# Patient Record
Sex: FEMALE | Race: WHITE | NOT HISPANIC OR LATINO | Employment: FULL TIME | URBAN - METROPOLITAN AREA
[De-identification: names, ages, dates, MRNs, and addresses within clinical notes are randomized per-mention and may not be internally consistent; named-entity substitution may affect disease eponyms.]

---

## 2017-01-13 ENCOUNTER — DOCUMENTATION (OUTPATIENT)
Dept: SPORTS MEDICINE | Facility: CLINIC | Age: 42
End: 2017-01-13

## 2017-01-13 NOTE — PROGRESS NOTES
1/13/17 At  2:25pm I called  and he said that we are the one that referred her to him and faxed papers to him asking him to see her. He did his part by seeing her. She also filled out paper work at time of visit with him. It is her responsability to find out what her Ins covers and what it don't. He will no longer discuss this issues with a third party such as this office, This is between Mrs. Batista and him from this point on. He will be more then happy to discuss payment plan with patient. If Mrs. Batista does not want to call him personal then  he will just let it go to collections. Katy AGUILAR

## 2017-01-16 ENCOUNTER — OFFICE VISIT (OUTPATIENT)
Dept: PSYCHIATRY | Facility: HOSPITAL | Age: 42
End: 2017-01-16

## 2017-01-16 DIAGNOSIS — F41.1 GENERALIZED ANXIETY DISORDER: Primary | ICD-10-CM

## 2017-01-16 PROCEDURE — 90834 PSYTX W PT 45 MINUTES: CPT | Performed by: SOCIAL WORKER

## 2017-01-17 NOTE — PROGRESS NOTES
7:05pm-7:55pm  Client is working mandatory overtime, not getting enough rest, and not spending as much time with her kids as she would like.  There is little time for exercise, etc.  She and ex had a conflict regarding a pharmacy bill for their daughter.  She did not engage in the fight but found it very stressful.  Divorce is final but he seems to still be engaged.

## 2017-02-03 ENCOUNTER — APPOINTMENT (OUTPATIENT)
Dept: PSYCHIATRY | Facility: HOSPITAL | Age: 42
End: 2017-02-03

## 2017-02-14 ENCOUNTER — OFFICE VISIT (OUTPATIENT)
Dept: PSYCHIATRY | Facility: HOSPITAL | Age: 42
End: 2017-02-14

## 2017-02-14 DIAGNOSIS — F41.1 GENERALIZED ANXIETY DISORDER: Primary | ICD-10-CM

## 2017-02-14 PROCEDURE — 90834 PSYTX W PT 45 MINUTES: CPT | Performed by: SOCIAL WORKER

## 2017-02-15 NOTE — PROGRESS NOTES
7:00pm-7:50pm  Note  Client learned through her daughter that ex- is dating a mom from their kids school-the mother of her daughter's close friends.  Her daughter is furious with Dad and wrote a very eloquent letter telling him so.  Client hates seeing her kids go through this.  Explained that her daughter's way of dealing with it is very adaptive.  Ex is now denying that there is a relationship but he has lost his credibility.  Client is still trying to understand how all this happened.

## 2017-02-21 RX ORDER — ACETAMINOPHEN AND CODEINE PHOSPHATE 120; 12 MG/5ML; MG/5ML
SOLUTION ORAL
Qty: 84 TABLET | OUTPATIENT
Start: 2017-02-21

## 2017-02-28 ENCOUNTER — OFFICE VISIT (OUTPATIENT)
Dept: PSYCHIATRY | Facility: HOSPITAL | Age: 42
End: 2017-02-28

## 2017-02-28 DIAGNOSIS — F41.1 GENERALIZED ANXIETY DISORDER: Primary | ICD-10-CM

## 2017-02-28 PROCEDURE — 90834 PSYTX W PT 45 MINUTES: CPT | Performed by: SOCIAL WORKER

## 2017-03-13 ENCOUNTER — OFFICE VISIT (OUTPATIENT)
Dept: PSYCHIATRY | Facility: HOSPITAL | Age: 42
End: 2017-03-13

## 2017-03-13 DIAGNOSIS — F41.1 GENERALIZED ANXIETY DISORDER: Primary | ICD-10-CM

## 2017-03-13 PROCEDURE — 90834 PSYTX W PT 45 MINUTES: CPT | Performed by: SOCIAL WORKER

## 2017-03-13 NOTE — PLAN OF CARE
Problem: [facility] Patient Care Overview (Adult)  Goal: Plan of Care Review    03/13/17 8699   Coping/Psychosocial Response Interventions   Plan Of Care Reviewed With patient   Coping/Psychosocial   Patient Agreement with Plan of Care agrees   Patient Care Overview   Progress progress toward functional goals as expected   Outcome Evaluation   Outcome Summary/Follow up Plan Client scored self the same as last review-3 on adjustment and 7 on self esteem. Realizes she is doing well, but feels sad.

## 2017-03-13 NOTE — PROGRESS NOTES
"4:05pm-4:55pm  Client sad, tearful, tired of being angry and upset about her ex.  She thinks he should communicate more with her about the kids.  Talked about the adjustment to being  and the \"new normal\".  She acknowledges that she does well most of the time but has bad days.  She confides in her family and her best friend-has not talked to the friend she believes chose her ex over her.  Recommended exercise.  She often works through lunch or takes a quick break but could go for a walk on nice days.  Going on a girls trip to [facility] in April to visit a friend.  Wants to feel better.  "

## 2017-04-03 ENCOUNTER — OFFICE VISIT (OUTPATIENT)
Dept: PSYCHIATRY | Facility: HOSPITAL | Age: 42
End: 2017-04-03

## 2017-04-03 DIAGNOSIS — F41.1 GENERALIZED ANXIETY DISORDER: Primary | ICD-10-CM

## 2017-04-03 PROCEDURE — 90834 PSYTX W PT 45 MINUTES: CPT | Performed by: SOCIAL WORKER

## 2017-04-04 NOTE — PROGRESS NOTES
6:00pm-6:50pm  Client is tired of being sad.  Son had his birthday at Dad's house and both sides of the family were there.  Ex behaves as if everything is OK now and they are one big happy family.  Client went to a function at school and the new girlfriend came up and asked to sit with her.  Daughter is angry with Dad and that is difficult for client.  Encouraged her to be supportive of daughter's feelings without getting involved.

## 2017-04-10 RX ORDER — SPIRONOLACTONE 50 MG/1
TABLET, FILM COATED ORAL
Qty: 180 TABLET | Refills: 2 | Status: SHIPPED | OUTPATIENT
Start: 2017-04-10 | End: 2017-12-17 | Stop reason: SDUPTHER

## 2017-04-18 ENCOUNTER — OFFICE VISIT (OUTPATIENT)
Dept: PSYCHIATRY | Facility: HOSPITAL | Age: 42
End: 2017-04-18

## 2017-04-18 DIAGNOSIS — F41.1 GENERALIZED ANXIETY DISORDER: Primary | ICD-10-CM

## 2017-04-18 PROCEDURE — 90834 PSYTX W PT 45 MINUTES: CPT | Performed by: SOCIAL WORKER

## 2017-04-19 NOTE — PROGRESS NOTES
"6:00pm-6:50pm  Client back from a vacation with friends and feeling much better.  It helped to get away and \"regroup\" after the divorce proceedings.  Continues spending time with family and friends.  Worries about her kids because ex seems so detached and she says does not look good-weight loss, unkempt.  Reminded client that all this is outside her control.  "

## 2017-05-05 RX ORDER — METOPROLOL SUCCINATE 50 MG/1
TABLET, EXTENDED RELEASE ORAL
Qty: 90 TABLET | Refills: 3 | Status: SHIPPED | OUTPATIENT
Start: 2017-05-05 | End: 2018-04-07 | Stop reason: SDUPTHER

## 2017-05-05 RX ORDER — TOPIRAMATE 50 MG/1
TABLET, FILM COATED ORAL
Qty: 90 TABLET | Refills: 3 | Status: SHIPPED | OUTPATIENT
Start: 2017-05-05 | End: 2019-02-28

## 2017-05-05 RX ORDER — FLUOXETINE HYDROCHLORIDE 20 MG/1
CAPSULE ORAL
Qty: 180 CAPSULE | Refills: 3 | Status: SHIPPED | OUTPATIENT
Start: 2017-05-05 | End: 2018-05-17 | Stop reason: SDUPTHER

## 2017-05-08 ENCOUNTER — OFFICE VISIT (OUTPATIENT)
Dept: PSYCHIATRY | Facility: HOSPITAL | Age: 42
End: 2017-05-08

## 2017-05-08 DIAGNOSIS — F41.1 GENERALIZED ANXIETY DISORDER: Primary | ICD-10-CM

## 2017-05-08 PROCEDURE — 90834 PSYTX W PT 45 MINUTES: CPT | Performed by: SOCIAL WORKER

## 2017-05-08 RX ORDER — ACETAMINOPHEN AND CODEINE PHOSPHATE 120; 12 MG/5ML; MG/5ML
SOLUTION ORAL
Qty: 84 TABLET | OUTPATIENT
Start: 2017-05-08

## 2017-05-23 ENCOUNTER — OFFICE VISIT (OUTPATIENT)
Dept: PSYCHIATRY | Facility: HOSPITAL | Age: 42
End: 2017-05-23

## 2017-05-23 DIAGNOSIS — F41.1 GENERALIZED ANXIETY DISORDER: Primary | ICD-10-CM

## 2017-05-23 PROCEDURE — 90834 PSYTX W PT 45 MINUTES: CPT | Performed by: SOCIAL WORKER

## 2017-06-06 ENCOUNTER — OFFICE VISIT (OUTPATIENT)
Dept: PSYCHIATRY | Facility: HOSPITAL | Age: 42
End: 2017-06-06

## 2017-06-06 DIAGNOSIS — F41.1 GENERALIZED ANXIETY DISORDER: Primary | ICD-10-CM

## 2017-06-06 PROCEDURE — 90834 PSYTX W PT 45 MINUTES: CPT | Performed by: SOCIAL WORKER

## 2017-06-06 NOTE — PROGRESS NOTES
6:00pm-6:50pm  Other than being sick with a head cold, client is doing very well.  Summers are busy but plans to go back to the divorce support group in the Fall.  Kids are on vacation with her ex.  Work is more manageable.  Gave contact info, as needed.

## 2017-12-18 RX ORDER — SPIRONOLACTONE 50 MG/1
TABLET, FILM COATED ORAL
Qty: 180 TABLET | Refills: 0 | Status: SHIPPED | OUTPATIENT
Start: 2017-12-18 | End: 2018-03-08 | Stop reason: SDUPTHER

## 2018-03-09 RX ORDER — SPIRONOLACTONE 50 MG/1
TABLET, FILM COATED ORAL
Qty: 180 TABLET | Refills: 0 | Status: SHIPPED | OUTPATIENT
Start: 2018-03-09 | End: 2018-05-02 | Stop reason: SDUPTHER

## 2018-04-09 RX ORDER — METOPROLOL SUCCINATE 50 MG/1
TABLET, EXTENDED RELEASE ORAL
Qty: 90 TABLET | Refills: 0 | Status: SHIPPED | OUTPATIENT
Start: 2018-04-09 | End: 2018-05-26 | Stop reason: SDUPTHER

## 2018-05-02 RX ORDER — SPIRONOLACTONE 50 MG/1
TABLET, FILM COATED ORAL
Qty: 180 TABLET | Refills: 2 | Status: SHIPPED | OUTPATIENT
Start: 2018-05-02 | End: 2019-01-14 | Stop reason: SDUPTHER

## 2018-05-18 RX ORDER — FLUOXETINE HYDROCHLORIDE 20 MG/1
CAPSULE ORAL
Qty: 180 CAPSULE | Refills: 0 | Status: SHIPPED | OUTPATIENT
Start: 2018-05-18 | End: 2018-05-26 | Stop reason: SDUPTHER

## 2018-05-29 RX ORDER — METOPROLOL SUCCINATE 50 MG/1
TABLET, EXTENDED RELEASE ORAL
Qty: 90 TABLET | Refills: 0 | Status: SHIPPED | OUTPATIENT
Start: 2018-05-29 | End: 2018-08-09 | Stop reason: SDUPTHER

## 2018-05-29 RX ORDER — FLUOXETINE HYDROCHLORIDE 20 MG/1
CAPSULE ORAL
Qty: 180 CAPSULE | Refills: 0 | Status: SHIPPED | OUTPATIENT
Start: 2018-05-29 | End: 2018-08-06 | Stop reason: SDUPTHER

## 2018-08-07 RX ORDER — FLUOXETINE HYDROCHLORIDE 20 MG/1
CAPSULE ORAL
Qty: 180 CAPSULE | Refills: 0 | Status: SHIPPED | OUTPATIENT
Start: 2018-08-07 | End: 2018-10-26 | Stop reason: SDUPTHER

## 2018-08-09 RX ORDER — METOPROLOL SUCCINATE 50 MG/1
TABLET, EXTENDED RELEASE ORAL
Qty: 90 TABLET | Refills: 2 | Status: SHIPPED | OUTPATIENT
Start: 2018-08-09 | End: 2021-03-29

## 2018-10-29 RX ORDER — FLUOXETINE HYDROCHLORIDE 20 MG/1
CAPSULE ORAL
Qty: 180 CAPSULE | Refills: 0 | Status: SHIPPED | OUTPATIENT
Start: 2018-10-29 | End: 2019-07-05

## 2019-01-14 ENCOUNTER — OFFICE VISIT (OUTPATIENT)
Dept: SPORTS MEDICINE | Facility: CLINIC | Age: 44
End: 2019-01-14

## 2019-01-14 VITALS
SYSTOLIC BLOOD PRESSURE: 104 MMHG | BODY MASS INDEX: 29.64 KG/M2 | TEMPERATURE: 98.1 F | HEIGHT: 60 IN | WEIGHT: 151 LBS | OXYGEN SATURATION: 98 % | HEART RATE: 112 BPM | DIASTOLIC BLOOD PRESSURE: 60 MMHG

## 2019-01-14 DIAGNOSIS — R41.3 MEMORY DISTURBANCE: ICD-10-CM

## 2019-01-14 DIAGNOSIS — R39.198 DIFFICULTY URINATING: ICD-10-CM

## 2019-01-14 DIAGNOSIS — R20.2 PARESTHESIAS: ICD-10-CM

## 2019-01-14 DIAGNOSIS — R61 NIGHT SWEATS: Primary | ICD-10-CM

## 2019-01-14 PROCEDURE — 99215 OFFICE O/P EST HI 40 MIN: CPT | Performed by: FAMILY MEDICINE

## 2019-01-14 RX ORDER — FERROUS SULFATE 325(65) MG
1 TABLET ORAL DAILY
COMMUNITY
End: 2019-05-10 | Stop reason: SDUPTHER

## 2019-01-14 RX ORDER — ACETAMINOPHEN AND CODEINE PHOSPHATE 120; 12 MG/5ML; MG/5ML
0.35 SOLUTION ORAL DAILY
COMMUNITY
Start: 2018-07-24 | End: 2021-03-29

## 2019-01-14 RX ORDER — LOSARTAN POTASSIUM 25 MG/1
TABLET ORAL
Refills: 1 | COMMUNITY
Start: 2018-12-21 | End: 2019-01-22 | Stop reason: SDUPTHER

## 2019-01-14 RX ORDER — FLUOXETINE HYDROCHLORIDE 20 MG/1
40 CAPSULE ORAL NIGHTLY
COMMUNITY
Start: 2016-09-19 | End: 2019-07-05

## 2019-01-14 RX ORDER — DOCUSATE SODIUM 100 MG/1
2 CAPSULE, LIQUID FILLED ORAL DAILY
COMMUNITY
Start: 2014-05-22 | End: 2019-05-10 | Stop reason: SDUPTHER

## 2019-01-14 RX ORDER — SPIRONOLACTONE 50 MG/1
50 TABLET, FILM COATED ORAL
COMMUNITY
End: 2020-05-12

## 2019-01-14 NOTE — PROGRESS NOTES
"Alyce is a 43 y.o. year old female    Chief Complaint   Patient presents with   • Numbness     extremities   • Difficulty Urinating   • Fatigue     with joint pain       History of Present Illness   HPI   Night sweats for the past several years  Hand tingling \"all the time\" in a nondermatomal distribution, seems to come and go, also has it in the feet.  Fatigue for the past couple years.  Trouble completely emptying baldder  Several days between BM but not consitpation, no sense of \"needing BM\"   Memory issues    Patient's main concern is possible multiple sclerosis.  I have reviewed the patient's medical, family, and social history in detail and updated the computerized patient record.    Review of Systems   Constitutional: Positive for fatigue.   HENT: Negative.    Eyes: Negative.    Respiratory: Negative.    Cardiovascular: Negative.    Gastrointestinal: Negative.    Endocrine: Negative.    Genitourinary: Negative.    Musculoskeletal: Negative.    Skin: Negative.    Allergic/Immunologic: Negative.    Neurological:        Per history of present illness   Hematological: Negative.    Psychiatric/Behavioral: Negative.        /60   Pulse 112   Temp 98.1 °F (36.7 °C)   Ht 152.4 cm (60\")   Wt 68.5 kg (151 lb)   SpO2 98%   BMI 29.49 kg/m²      Physical Exam   Constitutional: She is oriented to person, place, and time. She appears well-developed and well-nourished.   HENT:   Head: Normocephalic and atraumatic.   Eyes: Conjunctivae and EOM are normal. Pupils are equal, round, and reactive to light.   Cardiovascular: Normal rate, regular rhythm and normal heart sounds.   No peripheral edema   Pulmonary/Chest: Effort normal and breath sounds normal.   Musculoskeletal:   Negative Adson's and Yolis testing of upper extremities.  DTRs 1+ symmetric.  Negative Tinel over carpal tunnel.   Neurological: She is alert and oriented to person, place, and time. She exhibits normal muscle tone. Coordination normal.   Skin: " Skin is warm and dry.   Psychiatric: She has a normal mood and affect. Her behavior is normal. Thought content normal.   Vitals reviewed.        Current Outpatient Medications:   •  Calcium Carb-Cholecalciferol (CALCIUM 600 + D) 600-200 MG-UNIT tablet, Take 1 tablet by mouth Daily., Disp: , Rfl:   •  docusate sodium (COLACE) 100 MG capsule, Take 2 capsules by mouth daily., Disp: , Rfl:   •  docusate sodium (COLACE) 100 MG capsule, Take 2 capsules by mouth Daily., Disp: , Rfl:   •  FLUoxetine (PROzac) 20 MG capsule, TAKE 1 CAPSULE BY MOUTH TWO TIMES DAILY, Disp: 180 capsule, Rfl: 0  •  FLUoxetine (PROzac) 20 MG capsule, Take 40 mg by mouth Every Night., Disp: , Rfl:   •  losartan (COZAAR) 25 MG tablet, TK 1 T PO QAM, Disp: , Rfl: 1  •  metoprolol succinate XL (TOPROL-XL) 50 MG 24 hr tablet, TAKE 1 TABLET BY MOUTH  DAILY, Disp: 90 tablet, Rfl: 2  •  norethindrone (MICRONOR) 0.35 MG tablet, Take 0.35 mg by mouth Daily., Disp: , Rfl:   •  ferrous sulfate 325 (65 FE) MG tablet, Take 1 tablet by mouth Daily., Disp: , Rfl:   •  fexofenadine (WAL-FEX ALLERGY) 180 MG tablet, Take  by mouth., Disp: , Rfl:   •  spironolactone (ALDACTONE) 25 MG tablet, Take 25 mg by mouth 2 (Two) Times a Day., Disp: , Rfl:   •  topiramate (TOPAMAX) 50 MG tablet, Take 1 tablet by mouth at  bedtime, Disp: 90 tablet, Rfl: 3  •  VITAMIN D, CHOLECALCIFEROL, PO, Take 400 Units by mouth., Disp: , Rfl:      Diagnoses and all orders for this visit:    Night sweats  -     CBC & Differential  -     Comprehensive Metabolic Panel  -     TSH  -     T4, Free  -     Vitamin B12  -     Folate  -     Iron Profile  -     Vitamin D 25 Hydroxy  -     MRI brain wo contrast; Future  -     FSH & LH    Paresthesias  -     CBC & Differential  -     Comprehensive Metabolic Panel  -     TSH  -     T4, Free  -     Vitamin B12  -     Folate  -     Iron Profile  -     Vitamin D 25 Hydroxy  -     MRI brain wo contrast; Future  -     FSH & LH    Difficulty urinating  -     CBC  & Differential  -     Comprehensive Metabolic Panel  -     TSH  -     T4, Free  -     Vitamin B12  -     Folate  -     Iron Profile  -     Vitamin D 25 Hydroxy  -     MRI brain wo contrast; Future  -     FSH & LH    Memory disturbance  -     CBC & Differential  -     Comprehensive Metabolic Panel  -     TSH  -     T4, Free  -     Vitamin B12  -     Folate  -     Iron Profile  -     Vitamin D 25 Hydroxy  -     MRI brain wo contrast; Future  -     FSH & LH    Other orders  -     losartan (COZAAR) 25 MG tablet; TK 1 T PO QAM  -     VITAMIN D, CHOLECALCIFEROL, PO; Take 400 Units by mouth.  -     Calcium Carb-Cholecalciferol (CALCIUM 600 + D) 600-200 MG-UNIT tablet; Take 1 tablet by mouth Daily.  -     docusate sodium (COLACE) 100 MG capsule; Take 2 capsules by mouth Daily.  -     ferrous sulfate 325 (65 FE) MG tablet; Take 1 tablet by mouth Daily.  -     FLUoxetine (PROzac) 20 MG capsule; Take 40 mg by mouth Every Night.  -     norethindrone (MICRONOR) 0.35 MG tablet; Take 0.35 mg by mouth Daily.  -     spironolactone (ALDACTONE) 25 MG tablet; Take 25 mg by mouth 2 (Two) Times a Day.             EMR Dragon/Transcription disclaimer:    Much of this encounter note is an electronic transcription/translation of spoken language to printed text.  The electronic translation of spoken language may permit erroneous, or at times, nonsensical words or phrases to be inadvertently transcribed.  Although I have reviewed the note for such errors some may still exist.

## 2019-01-15 ENCOUNTER — TELEPHONE (OUTPATIENT)
Dept: SPORTS MEDICINE | Facility: CLINIC | Age: 44
End: 2019-01-15

## 2019-01-15 LAB
25(OH)D3+25(OH)D2 SERPL-MCNC: 117.9 NG/ML (ref 30–100)
ALBUMIN SERPL-MCNC: 4.6 G/DL (ref 3.5–5.2)
ALBUMIN/GLOB SERPL: 1.2 G/DL
ALP SERPL-CCNC: 93 U/L (ref 39–117)
ALT SERPL-CCNC: 26 U/L (ref 1–33)
AST SERPL-CCNC: 25 U/L (ref 1–32)
BASOPHILS # BLD AUTO: 0.04 10*3/MM3 (ref 0–0.2)
BASOPHILS NFR BLD AUTO: 0.4 % (ref 0–1.5)
BILIRUB SERPL-MCNC: <0.2 MG/DL (ref 0.1–1.2)
BUN SERPL-MCNC: 15 MG/DL (ref 6–20)
BUN/CREAT SERPL: 21.4 (ref 7–25)
CALCIUM SERPL-MCNC: 10 MG/DL (ref 8.6–10.5)
CHLORIDE SERPL-SCNC: 98 MMOL/L (ref 98–107)
CO2 SERPL-SCNC: 24.6 MMOL/L (ref 22–29)
CREAT SERPL-MCNC: 0.7 MG/DL (ref 0.57–1)
EOSINOPHIL # BLD AUTO: 0.36 10*3/MM3 (ref 0–0.7)
EOSINOPHIL NFR BLD AUTO: 3.6 % (ref 0.3–6.2)
ERYTHROCYTE [DISTWIDTH] IN BLOOD BY AUTOMATED COUNT: 12.3 % (ref 11.7–13)
FOLATE SERPL-MCNC: >20 NG/ML (ref 4.78–24.2)
FSH SERPL-ACNC: 3.5 MIU/ML
GLOBULIN SER CALC-MCNC: 3.7 GM/DL
GLUCOSE SERPL-MCNC: 103 MG/DL (ref 65–99)
HCT VFR BLD AUTO: 42.4 % (ref 35.6–45.5)
HGB BLD-MCNC: 14.2 G/DL (ref 11.9–15.5)
IMM GRANULOCYTES # BLD AUTO: 0.04 10*3/MM3 (ref 0–0.03)
IMM GRANULOCYTES NFR BLD AUTO: 0.4 % (ref 0–0.5)
IRON SATN MFR SERPL: 26 % (ref 20–50)
IRON SERPL-MCNC: 90 MCG/DL (ref 37–145)
LH SERPL-ACNC: 2.6 MIU/ML
LYMPHOCYTES # BLD AUTO: 2.9 10*3/MM3 (ref 0.9–4.8)
LYMPHOCYTES NFR BLD AUTO: 29.4 % (ref 19.6–45.3)
MCH RBC QN AUTO: 32 PG (ref 26.9–32)
MCHC RBC AUTO-ENTMCNC: 33.5 G/DL (ref 32.4–36.3)
MCV RBC AUTO: 95.5 FL (ref 80.5–98.2)
MONOCYTES # BLD AUTO: 1 10*3/MM3 (ref 0.2–1.2)
MONOCYTES NFR BLD AUTO: 10.1 % (ref 5–12)
NEUTROPHILS # BLD AUTO: 5.57 10*3/MM3 (ref 1.9–8.1)
NEUTROPHILS NFR BLD AUTO: 56.5 % (ref 42.7–76)
PLATELET # BLD AUTO: 348 10*3/MM3 (ref 140–500)
POTASSIUM SERPL-SCNC: 4.6 MMOL/L (ref 3.5–5.2)
PROT SERPL-MCNC: 8.3 G/DL (ref 6–8.5)
RBC # BLD AUTO: 4.44 10*6/MM3 (ref 3.9–5.2)
SODIUM SERPL-SCNC: 137 MMOL/L (ref 136–145)
T4 FREE SERPL-MCNC: 0.94 NG/DL (ref 0.93–1.7)
TIBC SERPL-MCNC: 340 MCG/DL
TSH SERPL DL<=0.005 MIU/L-ACNC: 2.22 MIU/ML (ref 0.27–4.2)
UIBC SERPL-MCNC: 250 MCG/DL
VIT B12 SERPL-MCNC: 470 PG/ML (ref 211–946)
WBC # BLD AUTO: 9.87 10*3/MM3 (ref 4.5–10.7)

## 2019-01-15 NOTE — TELEPHONE ENCOUNTER
Her Vit D is way too high, STOP vit D supplements now and recheck level in 6-8 weeks. I do not have the rest of her labs back yet

## 2019-01-15 NOTE — TELEPHONE ENCOUNTER
Frank (out [facility]) [phone],   Vitamin D level critical  Vit D 117.9 considered toxic anything over 100.

## 2019-01-17 ENCOUNTER — TELEPHONE (OUTPATIENT)
Dept: SPORTS MEDICINE | Facility: CLINIC | Age: 44
End: 2019-01-17

## 2019-01-18 RX ORDER — FLUOXETINE HYDROCHLORIDE 20 MG/1
CAPSULE ORAL
Qty: 180 CAPSULE | Refills: 3 | Status: SHIPPED | OUTPATIENT
Start: 2019-01-18 | End: 2019-07-05

## 2019-01-22 ENCOUNTER — TELEPHONE (OUTPATIENT)
Dept: SPORTS MEDICINE | Facility: CLINIC | Age: 44
End: 2019-01-22

## 2019-01-22 RX ORDER — LOSARTAN POTASSIUM 25 MG/1
25 TABLET ORAL DAILY
Qty: 90 TABLET | Refills: 3 | Status: SHIPPED | OUTPATIENT
Start: 2019-01-22 | End: 2019-05-10 | Stop reason: SDUPTHER

## 2019-01-22 NOTE — TELEPHONE ENCOUNTER
Patient LVM stating she has been going to [facility] and Dr. Magan Van has been weaning patient off of the metoprolol and placed patient on Losartan 25 mg. Patient requesting if you could continue prescribing the losartan?    Patient also requested copy of labs which have been mailed to address listed.

## 2019-01-24 ENCOUNTER — HOSPITAL ENCOUNTER (OUTPATIENT)
Dept: MRI IMAGING | Facility: HOSPITAL | Age: 44
Discharge: HOME | End: 2019-01-24
Admitting: FAMILY MEDICINE

## 2019-01-24 DIAGNOSIS — R41.3 MEMORY DISTURBANCE: ICD-10-CM

## 2019-01-24 DIAGNOSIS — R61 NIGHT SWEATS: ICD-10-CM

## 2019-01-24 DIAGNOSIS — R39.198 DIFFICULTY URINATING: ICD-10-CM

## 2019-01-24 DIAGNOSIS — R20.2 PARESTHESIAS: ICD-10-CM

## 2019-01-24 PROCEDURE — 70551 MRI BRAIN STEM W/O DYE: CPT

## 2019-01-28 ENCOUNTER — TELEPHONE (OUTPATIENT)
Dept: SPORTS MEDICINE | Facility: CLINIC | Age: 44
End: 2019-01-28

## 2019-01-28 NOTE — TELEPHONE ENCOUNTER
Pt notified. Scheduled for follow up 01/29    ----- Message from Yovani Daniels MD sent at 1/26/2019 10:58 AM EST -----  Can you have her make an appt to go over her MRI? There does not appear to be MS but there are some changes we need to talk about and look into.

## 2019-01-29 ENCOUNTER — OFFICE VISIT (OUTPATIENT)
Dept: SPORTS MEDICINE | Facility: CLINIC | Age: 44
End: 2019-01-29

## 2019-01-29 VITALS
OXYGEN SATURATION: 98 % | DIASTOLIC BLOOD PRESSURE: 76 MMHG | HEART RATE: 103 BPM | BODY MASS INDEX: 29.64 KG/M2 | HEIGHT: 60 IN | WEIGHT: 151 LBS | TEMPERATURE: 97.7 F | SYSTOLIC BLOOD PRESSURE: 124 MMHG

## 2019-01-29 DIAGNOSIS — R90.89 ABNORMAL FINDING ON MRI OF BRAIN: Primary | ICD-10-CM

## 2019-01-29 PROCEDURE — 99214 OFFICE O/P EST MOD 30 MIN: CPT | Performed by: FAMILY MEDICINE

## 2019-01-29 NOTE — PROGRESS NOTES
"Alyce is a 43 y.o. year old female    Chief Complaint   Patient presents with   • Follow-up     MRI results       History of Present Illness   HPI   Patient here to discuss recent MRI brain showing abnormalities of hyperintensity in FLAIR and T2 in the white matter.  It is suggested by radiology at these areas did not have typical findings of demyelinating processes but it could be early changes of chronic small vessel ischemia are from migraines or collagen vascular disease or hyper quite ability disorders.  Her blood pressure is well controlled and she has no history of migraines.  No family history of the above-mentioned possibilities.  I have reviewed the patient's medical, family, and social history in detail and updated the computerized patient record.    Review of Systems   Constitutional: Negative.    HENT: Negative.    Respiratory: Negative.    Cardiovascular: Negative.    Neurological:        Per previous history of present illness       /76   Pulse 103   Temp 97.7 °F (36.5 °C)   Ht 152.4 cm (60\")   Wt 68.5 kg (151 lb)   SpO2 98%   BMI 29.49 kg/m²      Physical Exam   Constitutional: She is oriented to person, place, and time. She appears well-developed and well-nourished.   HENT:   Head: Normocephalic and atraumatic.   Eyes: Conjunctivae and EOM are normal. Pupils are equal, round, and reactive to light.   Cardiovascular:   No peripheral edema   Pulmonary/Chest: Effort normal.   Neurological: She is alert and oriented to person, place, and time.   Skin: Skin is warm and dry.   Psychiatric: She has a normal mood and affect. Her behavior is normal.   Vitals reviewed.        Current Outpatient Medications:   •  Calcium Carb-Cholecalciferol (CALCIUM 600 + D) 600-200 MG-UNIT tablet, Take 1 tablet by mouth Daily., Disp: , Rfl:   •  docusate sodium (COLACE) 100 MG capsule, Take 2 capsules by mouth daily., Disp: , Rfl:   •  docusate sodium (COLACE) 100 MG capsule, Take 2 capsules by mouth Daily., " Disp: , Rfl:   •  ferrous sulfate 325 (65 FE) MG tablet, Take 1 tablet by mouth Daily., Disp: , Rfl:   •  fexofenadine (WAL-FEX ALLERGY) 180 MG tablet, Take  by mouth., Disp: , Rfl:   •  FLUoxetine (PROzac) 20 MG capsule, TAKE 1 CAPSULE BY MOUTH TWO TIMES DAILY, Disp: 180 capsule, Rfl: 0  •  FLUoxetine (PROzac) 20 MG capsule, Take 40 mg by mouth Every Night., Disp: , Rfl:   •  FLUoxetine (PROzac) 20 MG capsule, TAKE 1 CAPSULE BY MOUTH TWO TIMES DAILY, Disp: 180 capsule, Rfl: 3  •  losartan (COZAAR) 25 MG tablet, Take 1 tablet by mouth Daily., Disp: 90 tablet, Rfl: 3  •  metoprolol succinate XL (TOPROL-XL) 50 MG 24 hr tablet, TAKE 1 TABLET BY MOUTH  DAILY, Disp: 90 tablet, Rfl: 2  •  norethindrone (MICRONOR) 0.35 MG tablet, Take 0.35 mg by mouth Daily., Disp: , Rfl:   •  spironolactone (ALDACTONE) 25 MG tablet, Take 25 mg by mouth 2 (Two) Times a Day., Disp: , Rfl:   •  topiramate (TOPAMAX) 50 MG tablet, Take 1 tablet by mouth at  bedtime, Disp: 90 tablet, Rfl: 3  •  VITAMIN D, CHOLECALCIFEROL, PO, Take 400 Units by mouth., Disp: , Rfl:      Diagnoses and all orders for this visit:    Abnormal finding on MRI of brain  -     BRYNN With / DsDNA, RNP, Sjogrens A / B, Smith  -     Rheumatoid Arthritis Expanded Panel  -     Factor 5 Leiden  -     Lupus Anticoag / Cardiolipin Ab  -     Ambulatory Referral to Neurology       Will check the above labs but we'll also refer to neurology for further evaluation of the abnormal MRI findings and see if they correlate with her previous subjective complaints.      EMR Dragon/Transcription disclaimer:    Much of this encounter note is an electronic transcription/translation of spoken language to printed text.  The electronic translation of spoken language may permit erroneous, or at times, nonsensical words or phrases to be inadvertently transcribed.  Although I have reviewed the note for such errors some may still exist.

## 2019-02-04 LAB
ANA SER QL: NEGATIVE
APTT HEX PL PPP: 3 SEC
APTT IMM NP PPP: NORMAL SEC
APTT PPP 1:1 SALINE: NORMAL SEC
APTT PPP: 26.5 SEC
B2 GLYCOPROT1 IGA SER-ACNC: 11 SAU
B2 GLYCOPROT1 IGG SER-ACNC: <10 SGU
B2 GLYCOPROT1 IGM SER-ACNC: <10 SMU
CARDIOLIPIN IGG SER IA-ACNC: <10 GPL
CARDIOLIPIN IGM SER IA-ACNC: 10 MPL
CCP IGA+IGG SERPL IA-ACNC: 10 UNITS (ref 0–19)
CONFIRM DRVVT: NORMAL SEC
CRP SERPL-MCNC: 1.6 MG/L (ref 0–4.9)
DRVVT SCREEN TO CONFIRM RATIO: NORMAL RATIO
ERYTHROCYTE [SEDIMENTATION RATE] IN BLOOD BY WESTERGREN METHOD: 22 MM/HR (ref 0–32)
F5 GENE MUT ANL BLD/T: NORMAL
INR PPP: 1 RATIO
LA PPP-IMP: NORMAL
PROTHROMBIN TIME: 10.4 SEC
RHEUMATOID FACT SERPL-ACNC: <10 IU/ML (ref 0–13.9)
SCREEN DRVVT: 30.6 SEC
THROMBIN TIME: 16.6 SEC

## 2019-02-06 ENCOUNTER — TELEPHONE (OUTPATIENT)
Dept: SPORTS MEDICINE | Facility: CLINIC | Age: 44
End: 2019-02-06

## 2019-02-06 NOTE — TELEPHONE ENCOUNTER
Left vmail notifying pt    ----- Message from Yovani Daniels MD sent at 2/6/2019  8:14 AM EST -----  These labs look good

## 2019-02-13 ENCOUNTER — OFFICE VISIT (OUTPATIENT)
Dept: NEUROLOGY | Facility: CLINIC | Age: 44
End: 2019-02-13

## 2019-02-13 VITALS
DIASTOLIC BLOOD PRESSURE: 68 MMHG | WEIGHT: 149 LBS | OXYGEN SATURATION: 98 % | BODY MASS INDEX: 29.25 KG/M2 | SYSTOLIC BLOOD PRESSURE: 126 MMHG | HEIGHT: 60 IN | HEART RATE: 76 BPM

## 2019-02-13 DIAGNOSIS — G47.33 OBSTRUCTIVE SLEEP APNEA: Primary | ICD-10-CM

## 2019-02-13 PROCEDURE — 99244 OFF/OP CNSLTJ NEW/EST MOD 40: CPT | Performed by: PSYCHIATRY & NEUROLOGY

## 2019-02-13 RX ORDER — PHENTERMINE HYDROCHLORIDE 37.5 MG/1
37.5 CAPSULE ORAL EVERY MORNING
COMMUNITY
End: 2019-05-10 | Stop reason: SDUPTHER

## 2019-02-13 NOTE — PROGRESS NOTES
"Follow Up Visit:      Review: Problem List and Followup    Review of Systems   Constitutional: Positive for diaphoresis and fatigue. Negative for fever.   HENT: Negative for hearing loss, tinnitus and trouble swallowing.    Eyes: Negative for redness and itching.   Respiratory: Negative for cough, shortness of breath and wheezing.    Cardiovascular: Negative for chest pain, palpitations and leg swelling.   Gastrointestinal: Positive for constipation. Negative for diarrhea, nausea and vomiting.   Endocrine: Negative for cold intolerance, heat intolerance and polydipsia.   Genitourinary: Positive for difficulty urinating. Negative for decreased urine volume and urgency.   Musculoskeletal: Positive for arthralgias, back pain, neck pain and neck stiffness.   Skin: Negative for color change, rash and wound.   Allergic/Immunologic: Positive for environmental allergies. Negative for food allergies and immunocompromised state.   Neurological: Positive for numbness and headaches. Negative for dizziness, tremors, seizures, syncope, facial asymmetry, speech difficulty, weakness and light-headedness.   Hematological: Negative for adenopathy. Bruises/bleeds easily.   Psychiatric/Behavioral: Positive for decreased concentration. Negative for agitation, behavioral problems, confusion, dysphoric mood, hallucinations, self-injury, sleep disturbance and suicidal ideas. The patient is not nervous/anxious and is not hyperactive.        ROS Update      Vitals:    02/13/19 1444   BP: 126/68   Pulse: 76   SpO2: 98%   Weight: 67.6 kg (149 lb)   Height: 152.4 cm (60\")         Physical/Neurological Examination      Review Results-Workup/Diagnoses/Plan  "

## 2019-02-13 NOTE — PROGRESS NOTES
"Follow Up Visit:      Review: Problem List and Followup    Review of Systems   Constitutional: Positive for diaphoresis and fatigue. Negative for activity change, appetite change, chills, fever and unexpected weight change.   HENT: Negative for congestion, dental problem, drooling, ear discharge, ear pain, facial swelling, hearing loss, mouth sores, nosebleeds, postnasal drip, rhinorrhea, sinus pressure, sinus pain, sneezing, sore throat, tinnitus, trouble swallowing and voice change.    Eyes: Negative.    Respiratory: Negative.    Cardiovascular: Negative.    Gastrointestinal: Positive for constipation. Negative for abdominal distention, abdominal pain, anal bleeding, blood in stool, diarrhea, nausea, rectal pain and vomiting.   Endocrine: Negative.    Genitourinary: Positive for difficulty urinating. Negative for decreased urine volume, dyspareunia, dysuria, enuresis, flank pain, frequency, genital sores, hematuria, menstrual problem, pelvic pain, urgency, vaginal bleeding, vaginal discharge and vaginal pain.   Musculoskeletal: Positive for back pain, neck pain and neck stiffness. Negative for arthralgias, gait problem, joint swelling and myalgias.   Skin: Negative.    Allergic/Immunologic: Positive for environmental allergies. Negative for food allergies.   Neurological: Positive for numbness and headaches. Negative for dizziness, tremors, seizures, syncope, facial asymmetry, speech difficulty, weakness and light-headedness.   Hematological: Negative for adenopathy. Bruises/bleeds easily.   Psychiatric/Behavioral: Positive for decreased concentration. Negative for agitation, behavioral problems, confusion, dysphoric mood, hallucinations, self-injury, sleep disturbance and suicidal ideas. The patient is not nervous/anxious and is not hyperactive.        ROS Update      Vitals:    02/13/19 1444   BP: 126/68   Pulse: 76   SpO2: 98%   Weight: 67.6 kg (149 lb)   Height: 152.4 cm (60\")         Physical/Neurological " Examination      Review Results-Workup/Diagnoses/Plan

## 2019-02-28 ENCOUNTER — OFFICE VISIT (OUTPATIENT)
Dept: SLEEP MEDICINE | Facility: HOSPITAL | Age: 44
End: 2019-02-28

## 2019-02-28 VITALS
WEIGHT: 150.6 LBS | HEART RATE: 112 BPM | DIASTOLIC BLOOD PRESSURE: 86 MMHG | BODY MASS INDEX: 29.57 KG/M2 | SYSTOLIC BLOOD PRESSURE: 120 MMHG | OXYGEN SATURATION: 96 % | HEIGHT: 60 IN

## 2019-02-28 DIAGNOSIS — G47.33 OBSTRUCTIVE SLEEP APNEA: ICD-10-CM

## 2019-02-28 DIAGNOSIS — G47.10 HYPERSOMNIA: Primary | ICD-10-CM

## 2019-02-28 DIAGNOSIS — R06.83 SNORING: ICD-10-CM

## 2019-02-28 DIAGNOSIS — R41.3 MEMORY LOSS OF UNKNOWN CAUSE: ICD-10-CM

## 2019-02-28 PROCEDURE — G0463 HOSPITAL OUTPT CLINIC VISIT: HCPCS

## 2019-02-28 NOTE — PROGRESS NOTES
[facility]  Telephone: [phone] / Fax: [phone] [place]  Telephone: [phone] / Fax: [phone] Aranza Bryan    Referring Physician: Yovani Daniels MD  PCP: Yovani Daniels MD    Reason for consult:  sleep apnea    Alyce Batista is a 43 y.o.female  was seen in the [facility] today for evaluation of sleep apnea.  She had recent MRI done for evaluation of memory loss and headaches. It showed chronic ischemic changes unexplained by such young age. KARYN evaluation was recommended.  She denies sensation of throat collapsing or gasping respirations She does jerk herself awake at night. She has night sweats 3 times joyce week.She goes to bed at 9:30 and up 5am and is up multiple times per night.  She wakes up tired in the morning. No prior KARYN evaluation to date has been performed. She denies inappropriate sleep attacks during the day. She has no nightmares. She reports sleep talking and history of sleep walking. She reports very mild snoring. She denies RLS or RBD symptoms. She started taking Prozac in her late 20s. 2 years ago she went through a divorce. Her depression is currently controlled and does not seem to be a factor in nocturnal arousals or hypersomnia.    SH-  Works as pharmacist, drinks 4 cups of coffee, 3 alc per week.    ROS- + post nasal drip, +depression, + cold and heat intolerance.    Alyce Batista  has a past medical history of Concussion, Depression, Dysthymia, Environmental allergies, Head injury, Headache, Headache, tension-type, Hirsutism, Hypertension, Memory loss, and Migraine. HTN- on metoprolol.    Current Medications:    Current Outpatient Medications:   •  Calcium Carb-Cholecalciferol (CALCIUM 600 + D) 600-200 MG-UNIT tablet, Take 1 tablet by mouth Daily., Disp: , Rfl:   •  Cetirizine HCl 10 MG capsule, Take 10 mg by mouth Daily., Disp: , Rfl:   •  docusate sodium (COLACE) 100 MG capsule, Take 2 capsules by mouth daily., Disp:  ", Rfl:   •  docusate sodium (COLACE) 100 MG capsule, Take 2 capsules by mouth Daily., Disp: , Rfl:   •  ferrous sulfate 325 (65 FE) MG tablet, Take 1 tablet by mouth Daily., Disp: , Rfl:   •  fexofenadine (WAL-FEX ALLERGY) 180 MG tablet, Take  by mouth., Disp: , Rfl:   •  FLUoxetine (PROzac) 20 MG capsule, TAKE 1 CAPSULE BY MOUTH TWO TIMES DAILY, Disp: 180 capsule, Rfl: 0  •  FLUoxetine (PROzac) 20 MG capsule, Take 40 mg by mouth Every Night., Disp: , Rfl:   •  FLUoxetine (PROzac) 20 MG capsule, TAKE 1 CAPSULE BY MOUTH TWO TIMES DAILY, Disp: 180 capsule, Rfl: 3  •  losartan (COZAAR) 25 MG tablet, Take 1 tablet by mouth Daily., Disp: 90 tablet, Rfl: 3  •  metoprolol succinate XL (TOPROL-XL) 50 MG 24 hr tablet, TAKE 1 TABLET BY MOUTH  DAILY, Disp: 90 tablet, Rfl: 2  •  norethindrone (MICRONOR) 0.35 MG tablet, Take 0.35 mg by mouth Daily., Disp: , Rfl:   •  phentermine 37.5 MG capsule, Take 37.5 mg by mouth Every Morning., Disp: , Rfl:   •  spironolactone (ALDACTONE) 50 MG tablet, Take 50 mg by mouth., Disp: , Rfl:   •  topiramate (TOPAMAX) 50 MG tablet, Take 1 tablet by mouth at  bedtime, Disp: 90 tablet, Rfl: 3  •  VITAMIN D, CHOLECALCIFEROL, PO, Take 400 Units by mouth., Disp: , Rfl:     I have reviewed Past Medical History, Past Surgical History, Medication List, Social History and Family History as entered in Sleep Questionnaire and EPIC.    ESS  10   Vital Signs /86   Pulse 112   Ht 152.4 cm (60\")   Wt 68.3 kg (150 lb 9.6 oz)   SpO2 96%   BMI 29.41 kg/m²  Body mass index is 29.41 kg/m².    General Alert and oriented. No acute distress noted   Pharynx/Throat Class IV Mallampati airway, large tongue, no evidence of redundant lateral pharyngeal tissue. No oral lesions. No thrush. Moist mucous membranes.   Head Normocephalic. Symmetrical. Atraumatic.    Nose No septal deviation. No drainage   Chest Wall Normal shape. Symmetric expansion with respiration. No tenderness.   Neck Trachea midline, no " thyromegaly or adenopathy    Lungs Clear to auscultation bilaterally. No wheezes. No rhonchi. No rales. Respirations regular, even and unlabored.   Heart Regular rhythm and normal rate. Normal S1 and S2. No murmur   Abdomen Soft, non-tender and non-distended. Normal bowel sounds. No masses.   Extremities Moves all extremities well. No edema   Psychiatric Normal mood and affect.     .        .     Impression:  1. Hypersomnia    2. Obstructive sleep apnea(suspected)    3. Snoring    4. Memory loss of unknown cause          Plan:  I discussed the pathophysiology of obstructive sleep apnea with the patient.  We discussed the adverse outcomes associated with untreated sleep-disordered breathing.  We discussed treatment modalities of obstructive sleep apnea including CPAP device as well as oral mandibular advancement device. Sleep study will be scheduled to establish definitive diagnosis of sleep disorder breathing.  Weight loss will be strongly beneficial in order to reduce the severity of sleep-disordered breathing.  Patient has narrow oropharyngeal structure.  Caution during activities that require prolonged concentration is strongly advised.  Patient will be notified of sleep study results after sleep study is completed.  If sleep apnea is only mild,  oral mandibular advancement device may be one of the treatment options.  However if sleep apnea is moderately severe, CPAP treatment will be strongly encouraged.  The patient is not opposed to treatment with CPAP device if we confirm significant obstructive sleep apnea on polysomnography.       Thank you for allowing me to participate in your patient's care.    The patient will follow up with Dr. Stanford after completion of HST.    CHRIS Modi  [facility]  Phone: [phone]      Part of this note may be an electronic transcription/translation of spoken language to printed text using the Dragon Dictation System. Some errors may exist even though  the document was edited.

## 2019-03-18 ENCOUNTER — TELEPHONE (OUTPATIENT)
Dept: SPORTS MEDICINE | Facility: CLINIC | Age: 44
End: 2019-03-18

## 2019-03-18 DIAGNOSIS — Z91.09 ENVIRONMENTAL ALLERGIES: Primary | ICD-10-CM

## 2019-03-18 NOTE — TELEPHONE ENCOUNTER
Pt was evaluated in  over the weekend. Staff physician wants her to see an allergist for chronic allergies/eval for allergy shots, but for insurance purposes the referral must come from PCP. Can you place a referral or would you like to see her in the office first?

## 2019-03-25 ENCOUNTER — TELEPHONE (OUTPATIENT)
Dept: SPORTS MEDICINE | Facility: CLINIC | Age: 44
End: 2019-03-25

## 2019-03-26 DIAGNOSIS — Z91.09 ENVIRONMENTAL ALLERGIES: Primary | ICD-10-CM

## 2019-03-29 ENCOUNTER — TELEPHONE (OUTPATIENT)
Dept: SPORTS MEDICINE | Facility: CLINIC | Age: 44
End: 2019-03-29

## 2019-04-08 ENCOUNTER — HOSPITAL ENCOUNTER (OUTPATIENT)
Dept: SLEEP MEDICINE | Facility: HOSPITAL | Age: 44
Discharge: HOME | End: 2019-04-08
Admitting: NURSE PRACTITIONER

## 2019-04-08 DIAGNOSIS — G47.33 OBSTRUCTIVE SLEEP APNEA: ICD-10-CM

## 2019-04-08 PROCEDURE — 95806 SLEEP STUDY UNATT&RESP EFFT: CPT

## 2019-04-15 ENCOUNTER — TELEPHONE (OUTPATIENT)
Dept: SLEEP MEDICINE | Facility: HOSPITAL | Age: 44
End: 2019-04-15

## 2019-05-10 ENCOUNTER — OFFICE VISIT (OUTPATIENT)
Dept: NEUROLOGY | Facility: CLINIC | Age: 44
End: 2019-05-10

## 2019-05-10 VITALS
SYSTOLIC BLOOD PRESSURE: 126 MMHG | HEART RATE: 105 BPM | HEIGHT: 60 IN | DIASTOLIC BLOOD PRESSURE: 70 MMHG | OXYGEN SATURATION: 98 % | WEIGHT: 147 LBS | BODY MASS INDEX: 28.86 KG/M2

## 2019-05-10 DIAGNOSIS — R51.9 FREQUENT HEADACHES: Primary | ICD-10-CM

## 2019-05-10 PROCEDURE — 99212 OFFICE O/P EST SF 10 MIN: CPT | Performed by: PSYCHIATRY & NEUROLOGY

## 2019-05-10 RX ORDER — LOSARTAN POTASSIUM 25 MG/1
25 TABLET ORAL DAILY
COMMUNITY
Start: 2019-02-05 | End: 2019-07-05 | Stop reason: SDUPTHER

## 2019-05-10 RX ORDER — SYRINGE WITH NEEDLE, 1 ML 28GX1/2"
SYRINGE, EMPTY DISPOSABLE MISCELLANEOUS
Refills: 6 | COMMUNITY
Start: 2019-04-20 | End: 2021-03-29

## 2019-05-10 RX ORDER — MONTELUKAST SODIUM 10 MG/1
10 TABLET ORAL NIGHTLY
COMMUNITY
Start: 2019-04-25

## 2019-05-10 RX ORDER — TRIAMCINOLONE ACETONIDE 1 MG/G
1 CREAM TOPICAL DAILY PRN
Refills: 6 | COMMUNITY
Start: 2019-04-02

## 2019-05-10 RX ORDER — DOCUSATE SODIUM 100 MG/1
100 CAPSULE, LIQUID FILLED ORAL DAILY
COMMUNITY
Start: 2014-05-22 | End: 2021-03-29

## 2019-05-10 RX ORDER — PHENTERMINE HYDROCHLORIDE 37.5 MG/1
37.5 CAPSULE ORAL DAILY
COMMUNITY
End: 2021-03-29

## 2019-05-10 RX ORDER — FERROUS SULFATE 325(65) MG
325 TABLET ORAL DAILY
COMMUNITY
End: 2021-03-29

## 2019-05-10 NOTE — PROGRESS NOTES
"Follow Up Visit:Multiple symptoms  Alyce returns after Sleep Study: I brought up the official reprot and reviewed wioth ehr. This was a normal study  We discussed ehr concerns about different diagnosis and I reassured her that clinically I see no high suspicion for MS.  After some discussion for about 10 minutes she decided she will for now sit tight, see how things go and will stay in otuch PRN. In the meantime we will do no further investiagtions        Review of Systems   Constitutional: Positive for fatigue. Negative for chills and fever.   HENT: Positive for sinus pressure. Negative for hearing loss, tinnitus and trouble swallowing.    Eyes: Negative for pain, redness and itching.   Respiratory: Negative for cough, shortness of breath and wheezing.    Cardiovascular: Negative for chest pain, palpitations and leg swelling.   Gastrointestinal: Negative for constipation, diarrhea, nausea and vomiting.   Endocrine: Negative for cold intolerance, heat intolerance and polydipsia.   Genitourinary: Positive for frequency. Negative for decreased urine volume and urgency.   Musculoskeletal: Negative for gait problem, neck pain and neck stiffness.   Skin: Negative for color change, rash and wound.   Allergic/Immunologic: Positive for environmental allergies. Negative for food allergies and immunocompromised state.   Neurological: Positive for headaches. Negative for dizziness, tremors, seizures, syncope, facial asymmetry, speech difficulty, weakness, light-headedness and numbness.   Hematological: Negative for adenopathy. Does not bruise/bleed easily.   Psychiatric/Behavioral: Negative for agitation, behavioral problems, confusion, decreased concentration, dysphoric mood, hallucinations, self-injury, sleep disturbance and suicidal ideas. The patient is not nervous/anxious and is not hyperactive.        Vitals:    05/10/19 1035   BP: 126/70   Pulse: 105   SpO2: 98%   Weight: 66.7 kg (147 lb)   Height: 152.4 cm (60\") "

## 2019-05-17 DIAGNOSIS — R79.89 HIGH SERUM VITAMIN D: Primary | ICD-10-CM

## 2019-05-21 ENCOUNTER — RESULTS ENCOUNTER (OUTPATIENT)
Dept: SPORTS MEDICINE | Facility: CLINIC | Age: 44
End: 2019-05-21

## 2019-05-21 DIAGNOSIS — R79.89 HIGH SERUM VITAMIN D: ICD-10-CM

## 2019-05-22 LAB — 25(OH)D3+25(OH)D2 SERPL-MCNC: 63.9 NG/ML (ref 30–100)

## 2019-06-18 RX ORDER — SPIRONOLACTONE 50 MG/1
TABLET, FILM COATED ORAL
Qty: 180 TABLET | Refills: 2 | Status: SHIPPED | OUTPATIENT
Start: 2019-06-18 | End: 2019-07-05 | Stop reason: SDUPTHER

## 2019-07-05 ENCOUNTER — OFFICE VISIT (OUTPATIENT)
Dept: SPORTS MEDICINE | Facility: CLINIC | Age: 44
End: 2019-07-05

## 2019-07-05 VITALS
HEIGHT: 60 IN | HEART RATE: 109 BPM | BODY MASS INDEX: 29.45 KG/M2 | SYSTOLIC BLOOD PRESSURE: 136 MMHG | DIASTOLIC BLOOD PRESSURE: 80 MMHG | WEIGHT: 150 LBS | OXYGEN SATURATION: 98 %

## 2019-07-05 DIAGNOSIS — G43.809 OTHER MIGRAINE WITHOUT STATUS MIGRAINOSUS, NOT INTRACTABLE: ICD-10-CM

## 2019-07-05 DIAGNOSIS — I10 ESSENTIAL HYPERTENSION: Primary | ICD-10-CM

## 2019-07-05 PROCEDURE — 99214 OFFICE O/P EST MOD 30 MIN: CPT | Performed by: FAMILY MEDICINE

## 2019-07-05 RX ORDER — LOSARTAN POTASSIUM 100 MG/1
100 TABLET ORAL DAILY
Qty: 30 TABLET | Refills: 2 | Status: SHIPPED | OUTPATIENT
Start: 2019-07-05 | End: 2019-08-06 | Stop reason: SDUPTHER

## 2019-07-05 NOTE — PROGRESS NOTES
"Alyce is a 44 y.o. year old female evaluation of a problem that is new to this examiner.    Chief Complaint   Patient presents with   • Hypertension     slowly been elevated and is experiencing headaches       History of Present Illness   HPI   Patient has been going to a weight loss center, being treated with phentermine as well as diet changes.  Over the past few weeks has noted systolic blood pressure into the 140s also associated with generalized headaches but also exacerbation of migraine headaches.  The migraines cause nausea with phonophobia and photophobia.  No focal motor or sensory symptoms.  No chest pain, shortness of breath, syncope or near syncope.    I have reviewed the patient's medical, family, and social history in detail and updated the computerized patient record.    Review of Systems   Constitutional: Negative for fever.   Musculoskeletal:        Per HPI   Skin: Negative for wound.   Neurological: Positive for headaches. Negative for numbness.   All other systems reviewed and are negative.      /80 (BP Location: Left arm, Patient Position: Sitting, Cuff Size: Adult)   Pulse 109   Ht 152.4 cm (60\")   Wt 68 kg (150 lb)   SpO2 98%   BMI 29.29 kg/m²      Physical Exam   Constitutional: She is oriented to person, place, and time. She appears well-developed and well-nourished.   HENT:   Head: Normocephalic and atraumatic.   Eyes: Conjunctivae and EOM are normal. Pupils are equal, round, and reactive to light.   Cardiovascular: Normal rate, regular rhythm and normal heart sounds.   No peripheral edema   Pulmonary/Chest: Effort normal.   Neurological: She is alert and oriented to person, place, and time.   Skin: Skin is warm and dry.   Psychiatric: She has a normal mood and affect. Her behavior is normal.   Vitals reviewed.        Current Outpatient Medications:   •  ALLERGIST TRAY 1/2CC 27GX1/2\" 27G X 1/2\" 0.5 ML kit, USE UTD FOR ALLERGY INJECTION ADMINISTRATION, Disp: , Rfl: 6  •  Calcium " Carb-Cholecalciferol (CALCIUM 600 + D) 600-200 MG-UNIT tablet, Take 1 tablet by mouth Daily., Disp: , Rfl:   •  Cetirizine HCl 10 MG capsule, Take 10 mg by mouth Daily., Disp: , Rfl:   •  docusate sodium (COLACE) 100 MG capsule, Take 100 mg by mouth Daily., Disp: , Rfl:   •  ferrous sulfate 325 (65 FE) MG tablet, Take 325 mg by mouth Daily., Disp: , Rfl:   •  fexofenadine (WAL-FEX ALLERGY) 180 MG tablet, Take  by mouth., Disp: , Rfl:   •  losartan (COZAAR) 100 MG tablet, Take 1 tablet by mouth Daily., Disp: 30 tablet, Rfl: 2  •  montelukast (SINGULAIR) 10 MG tablet, , Disp: , Rfl:   •  norethindrone (MICRONOR) 0.35 MG tablet, Take 0.35 mg by mouth Daily., Disp: , Rfl:   •  phentermine 37.5 MG capsule, Take 37.5 mg by mouth Daily., Disp: , Rfl:   •  spironolactone (ALDACTONE) 50 MG tablet, Take 50 mg by mouth., Disp: , Rfl:   •  triamcinolone (KENALOG) 0.1 % cream, APPLY TO AFFECTED AREA BID TO RASH PRN, Disp: , Rfl: 6  •  VITAMIN D, CHOLECALCIFEROL, PO, Take 400 Units by mouth., Disp: , Rfl:   •  metoprolol succinate XL (TOPROL-XL) 50 MG 24 hr tablet, TAKE 1 TABLET BY MOUTH  DAILY, Disp: 90 tablet, Rfl: 2     Diagnoses and all orders for this visit:    Essential hypertension  -     losartan (COZAAR) 100 MG tablet; Take 1 tablet by mouth Daily.    Other migraine without status migrainosus, not intractable    Hypertension and migraines at this point not controlled.  Would be difficult to say if the blood pressure is causing the exacerbation of migraines or if the migraines are causing elevation of blood pressure.  Metoprolol in the past has caused some fairly significant fatigue for her therefore for now we will increase losartan to 100 mg p.o. daily and if her blood pressure is in the 120s then will continue at that dose and check BMP in 3 to 4 weeks.  If her blood pressure improves but her headaches do not then would consider changing metoprolol to Bystolic.  EMR Dragon/Transcription disclaimer:    Much of this  encounter note is an electronic transcription/translation of spoken language to printed text.  The electronic translation of spoken language may permit erroneous, or at times, nonsensical words or phrases to be inadvertently transcribed.  Although I have reviewed the note for such errors some may still exist.

## 2019-08-06 DIAGNOSIS — I10 ESSENTIAL HYPERTENSION: ICD-10-CM

## 2019-08-06 RX ORDER — LOSARTAN POTASSIUM 100 MG/1
100 TABLET ORAL DAILY
Qty: 90 TABLET | Refills: 3 | Status: SHIPPED | OUTPATIENT
Start: 2019-08-06 | End: 2020-06-17

## 2019-08-06 NOTE — TELEPHONE ENCOUNTER
Pt called stating her new BP medication is working well that she has been keeping track of BP at home has been running around 115/69 -126/82 want refill sent to mail order she medication does work for her

## 2019-09-23 ENCOUNTER — OFFICE VISIT (OUTPATIENT)
Dept: SPORTS MEDICINE | Facility: CLINIC | Age: 44
End: 2019-09-23

## 2019-09-23 VITALS
HEIGHT: 60 IN | OXYGEN SATURATION: 98 % | HEART RATE: 96 BPM | WEIGHT: 156.8 LBS | BODY MASS INDEX: 30.78 KG/M2 | DIASTOLIC BLOOD PRESSURE: 78 MMHG | SYSTOLIC BLOOD PRESSURE: 102 MMHG

## 2019-09-23 DIAGNOSIS — R79.89 HIGH SERUM VITAMIN D: ICD-10-CM

## 2019-09-23 DIAGNOSIS — I10 ESSENTIAL HYPERTENSION: Primary | ICD-10-CM

## 2019-09-23 PROCEDURE — 99213 OFFICE O/P EST LOW 20 MIN: CPT | Performed by: FAMILY MEDICINE

## 2019-09-23 NOTE — PROGRESS NOTES
"Alyce is a 44 y.o. year old female follow up on a problem familiar to this examiner.     Chief Complaint   Patient presents with   • Follow-up     increase BP meds       History of Present Illness   HPI   1.  Follow-up hypertension, on last visit we increase her dose of losartan to 100 mg a day.  Patient has noted overall she is feeling much better, headaches have pretty much resolved.  Blood pressures outside the office have been less than 120/80.  No chest pain, shortness of breath, syncope or near syncope.  2.  Patient was found to have elevated vitamin D levels in January of this year, her supplements were all discontinued and in May of this year her vitamin D level had dropped to 63.  Patient would like to restart calcium with vitamin D.    I have reviewed the patient's medical, family, and social history in detail and updated the computerized patient record.    Review of Systems   Constitutional: Negative.    HENT: Negative.    Respiratory: Negative.    Cardiovascular: Negative.    Neurological: Negative.        /78   Pulse 96   Ht 152.4 cm (60\")   Wt 71.1 kg (156 lb 12.8 oz)   SpO2 98%   BMI 30.62 kg/m²      Physical Exam   Constitutional: She is oriented to person, place, and time. She appears well-developed and well-nourished.   HENT:   Head: Normocephalic and atraumatic.   Eyes: Conjunctivae and EOM are normal. Pupils are equal, round, and reactive to light.   Cardiovascular:   No peripheral edema   Pulmonary/Chest: Effort normal.   Neurological: She is alert and oriented to person, place, and time.   Skin: Skin is warm and dry.   Psychiatric: She has a normal mood and affect. Her behavior is normal.   Vitals reviewed.        Current Outpatient Medications:   •  ALLERGIST TRAY 1/2CC 27GX1/2\" 27G X 1/2\" 0.5 ML kit, USE UTD FOR ALLERGY INJECTION ADMINISTRATION, Disp: , Rfl: 6  •  Calcium Carb-Cholecalciferol (CALCIUM 600 + D) 600-200 MG-UNIT tablet, Take 1 tablet by mouth Daily., Disp: , Rfl:   •  " Cetirizine HCl 10 MG capsule, Take 10 mg by mouth Daily., Disp: , Rfl:   •  docusate sodium (COLACE) 100 MG capsule, Take 100 mg by mouth Daily., Disp: , Rfl:   •  ferrous sulfate 325 (65 FE) MG tablet, Take 325 mg by mouth Daily., Disp: , Rfl:   •  fexofenadine (WAL-FEX ALLERGY) 180 MG tablet, Take  by mouth., Disp: , Rfl:   •  losartan (COZAAR) 100 MG tablet, Take 1 tablet by mouth Daily., Disp: 90 tablet, Rfl: 3  •  metoprolol succinate XL (TOPROL-XL) 50 MG 24 hr tablet, TAKE 1 TABLET BY MOUTH  DAILY, Disp: 90 tablet, Rfl: 2  •  montelukast (SINGULAIR) 10 MG tablet, , Disp: , Rfl:   •  norethindrone (MICRONOR) 0.35 MG tablet, Take 0.35 mg by mouth Daily., Disp: , Rfl:   •  spironolactone (ALDACTONE) 50 MG tablet, Take 50 mg by mouth., Disp: , Rfl:   •  triamcinolone (KENALOG) 0.1 % cream, APPLY TO AFFECTED AREA BID TO RASH PRN, Disp: , Rfl: 6  •  VITAMIN D, CHOLECALCIFEROL, PO, Take 400 Units by mouth., Disp: , Rfl:   •  phentermine 37.5 MG capsule, Take 37.5 mg by mouth Daily., Disp: , Rfl:      Diagnoses and all orders for this visit:    Essential hypertension    High serum vitamin D  -     Vitamin D 25 Hydroxy; Future       1.  Continue her current medications at the present dosages.  2.  Patient may restart her over-the-counter calcium with vitamin D and will plan to repeat vitamin D level in December this year.      EMR Dragon/Transcription disclaimer:    Much of this encounter note is an electronic transcription/translation of spoken language to printed text.  The electronic translation of spoken language may permit erroneous, or at times, nonsensical words or phrases to be inadvertently transcribed.  Although I have reviewed the note for such errors some may still exist.

## 2019-11-18 ENCOUNTER — TELEPHONE (OUTPATIENT)
Dept: SPORTS MEDICINE | Facility: CLINIC | Age: 44
End: 2019-11-18

## 2019-11-18 NOTE — TELEPHONE ENCOUNTER
[facility] requesting a referral for allergic rhinitis, starting date 11/08/19.    Please advice.

## 2019-11-20 DIAGNOSIS — J30.9 ALLERGIC RHINITIS, UNSPECIFIED SEASONALITY, UNSPECIFIED TRIGGER: Primary | ICD-10-CM

## 2019-11-22 NOTE — TELEPHONE ENCOUNTER
Ref # [id number]      Valid from 11/01/2019- 04/01/2019    For Dr. Marcos PENN.     Faxed copy of referral with ref# to  Alyce at [phone].

## 2019-11-27 RX ORDER — FLUOXETINE HYDROCHLORIDE 20 MG/1
CAPSULE ORAL
Qty: 180 CAPSULE | Refills: 2 | Status: SHIPPED | OUTPATIENT
Start: 2019-11-27 | End: 2020-08-06

## 2019-12-04 ENCOUNTER — RESULTS ENCOUNTER (OUTPATIENT)
Dept: SPORTS MEDICINE | Facility: CLINIC | Age: 44
End: 2019-12-04

## 2019-12-04 DIAGNOSIS — R79.89 HIGH SERUM VITAMIN D: ICD-10-CM

## 2019-12-13 LAB — 25(OH)D3+25(OH)D2 SERPL-MCNC: 63.9 NG/ML (ref 30–100)

## 2020-05-12 RX ORDER — SPIRONOLACTONE 50 MG/1
TABLET, FILM COATED ORAL
Qty: 180 TABLET | Refills: 3 | Status: SHIPPED | OUTPATIENT
Start: 2020-05-12 | End: 2021-03-22

## 2020-05-21 DIAGNOSIS — R79.89 HIGH SERUM VITAMIN D: Primary | ICD-10-CM

## 2020-06-04 LAB — 25(OH)D3+25(OH)D2 SERPL-MCNC: 62.7 NG/ML (ref 30–100)

## 2020-06-16 DIAGNOSIS — I10 ESSENTIAL HYPERTENSION: ICD-10-CM

## 2020-06-17 RX ORDER — LOSARTAN POTASSIUM 100 MG/1
100 TABLET ORAL DAILY
Qty: 90 TABLET | Refills: 3 | Status: SHIPPED | OUTPATIENT
Start: 2020-06-17 | End: 2021-05-12

## 2020-08-06 RX ORDER — FLUOXETINE HYDROCHLORIDE 20 MG/1
CAPSULE ORAL
Qty: 180 CAPSULE | Refills: 3 | Status: SHIPPED | OUTPATIENT
Start: 2020-08-06 | End: 2021-06-28

## 2021-03-22 RX ORDER — SPIRONOLACTONE 50 MG/1
TABLET, FILM COATED ORAL
Qty: 180 TABLET | Refills: 3 | Status: SHIPPED | OUTPATIENT
Start: 2021-03-22 | End: 2022-02-14

## 2021-03-29 ENCOUNTER — OFFICE VISIT (OUTPATIENT)
Dept: SPORTS MEDICINE | Facility: CLINIC | Age: 46
End: 2021-03-29

## 2021-03-29 VITALS
DIASTOLIC BLOOD PRESSURE: 64 MMHG | HEART RATE: 80 BPM | WEIGHT: 156 LBS | TEMPERATURE: 97.8 F | OXYGEN SATURATION: 99 % | BODY MASS INDEX: 30.63 KG/M2 | SYSTOLIC BLOOD PRESSURE: 102 MMHG | HEIGHT: 60 IN

## 2021-03-29 DIAGNOSIS — E03.9 ACQUIRED HYPOTHYROIDISM: Chronic | ICD-10-CM

## 2021-03-29 DIAGNOSIS — I10 ESSENTIAL HYPERTENSION: Primary | Chronic | ICD-10-CM

## 2021-03-29 DIAGNOSIS — Z91.09 ENVIRONMENTAL ALLERGIES: ICD-10-CM

## 2021-03-29 PROCEDURE — 99214 OFFICE O/P EST MOD 30 MIN: CPT | Performed by: FAMILY MEDICINE

## 2021-03-29 RX ORDER — LEVOTHYROXINE SODIUM 0.07 MG/1
75 TABLET ORAL DAILY
Qty: 90 TABLET | Refills: 3 | Status: SHIPPED | OUTPATIENT
Start: 2021-03-29 | End: 2022-01-18

## 2021-03-29 RX ORDER — CHLORCYCLIZINE HYDROCHLORIDE AND PSEUDOEPHEDRINE HYDROCHLORIDE 25; 60 MG/1; MG/1
1 TABLET ORAL 2 TIMES DAILY
Qty: 60 TABLET | Refills: 11 | Status: SHIPPED | OUTPATIENT
Start: 2021-03-29 | End: 2022-04-20

## 2021-03-29 RX ORDER — LEVOTHYROXINE SODIUM 0.07 MG/1
75 TABLET ORAL DAILY
Qty: 90 TABLET | Refills: 3 | Status: SHIPPED | OUTPATIENT
Start: 2021-03-29 | End: 2021-03-29 | Stop reason: SDUPTHER

## 2021-03-29 NOTE — PROGRESS NOTES
"Chief Complaint  Hyperlipidemia (follow up on cholesterol medications )    Subjective          Alyce Batista presents to [facility]  History of Present Illness  1.  Patient had recent biometric screening at her work, was told her lipids are elevated.  I have reviewed those labs and her cholesterol numbers, her HDL is 66, total cholesterol 246, , triglycerides 2 66.  Patient has a family history of heart disease.  Patient also has a history of hypertension.  2.  Patient was diagnosed with hypothyroidism in the past and was treated with NP thyroid, insurance no longer pays for this and the nurse practitioner who diagnosed her with hypothyroidism has retired.  She would like to try levothyroxine if possible.  3.  Patient with environmental allergies needs prescription for Stahist AD.  Objective   Vital Signs:   /64 (BP Location: Left arm, Patient Position: Sitting, Cuff Size: Adult)   Pulse 80   Temp 97.8 °F (36.6 °C) (Temporal)   Ht 152.4 cm (60\")   Wt 70.8 kg (156 lb)   SpO2 99%   BMI 30.47 kg/m²     Physical Exam  Vitals reviewed.   Constitutional:       Appearance: She is well-developed.   HENT:      Head: Normocephalic and atraumatic.   Eyes:      Conjunctiva/sclera: Conjunctivae normal.      Pupils: Pupils are equal, round, and reactive to light.   Cardiovascular:      Rate and Rhythm: Normal rate and regular rhythm.      Pulses: Normal pulses.      Heart sounds: Normal heart sounds.      Comments: No peripheral edema  Pulmonary:      Effort: Pulmonary effort is normal.      Breath sounds: Normal breath sounds.   Skin:     General: Skin is warm and dry.   Neurological:      Mental Status: She is alert and oriented to person, place, and time.   Psychiatric:         Behavior: Behavior normal.        Result Review :                SCANNED - LABS (03/11/2021)  AHA CRS calculated to 0.7%      Assessment and Plan    Diagnoses and all orders for this visit:    1. " Essential hypertension (Primary)  -     Comprehensive Metabolic Panel; Future  -     CBC & Differential; Future    2. Acquired hypothyroidism  -     Discontinue: levothyroxine (Synthroid) 75 MCG tablet; Take 1 tablet by mouth Daily.  Dispense: 90 tablet; Refill: 3  -     TSH; Future  -     T4, Free; Future  -     levothyroxine (Synthroid) 75 MCG tablet; Take 1 tablet by mouth Daily.  Dispense: 90 tablet; Refill: 3    3. Environmental allergies  -     Chlorcyclizine-Pseudoephed (Stahist AD) 25-60 MG tablet; Take 1 tablet by mouth 2 (two) times a day.  Dispense: 60 tablet; Refill: 11    1.  After placing her information into the American [employer] Association cardiac risk score her risk score is 0.7% for the next 10 years.  I do not believe she requires lipid-lowering medication at this time but would recommend increasing walking by 30 minutes/day and lowering fat in her diet and plan to repeat lipids 6 months.  2.  We will start levothyroxine 75 mcg a day and follow-up 1 month with free T4 and TSH.    Follow Up   No follow-ups on file.  Patient was given instructions and counseling regarding her condition or for health maintenance advice. Please see specific information pulled into the AVS if appropriate.

## 2021-04-02 ENCOUNTER — BULK ORDERING (OUTPATIENT)
Dept: CASE MANAGEMENT | Facility: OTHER | Age: 46
End: 2021-04-02

## 2021-04-02 DIAGNOSIS — Z23 IMMUNIZATION DUE: ICD-10-CM

## 2021-05-11 DIAGNOSIS — I10 ESSENTIAL HYPERTENSION: ICD-10-CM

## 2021-05-12 RX ORDER — LOSARTAN POTASSIUM 100 MG/1
100 TABLET ORAL DAILY
Qty: 90 TABLET | Refills: 3 | Status: SHIPPED | OUTPATIENT
Start: 2021-05-12 | End: 2022-04-11

## 2021-06-28 RX ORDER — FLUOXETINE HYDROCHLORIDE 20 MG/1
CAPSULE ORAL
Qty: 180 CAPSULE | Refills: 3 | Status: SHIPPED | OUTPATIENT
Start: 2021-06-28 | End: 2022-06-13 | Stop reason: SDUPTHER

## 2022-01-10 ENCOUNTER — TELEPHONE (OUTPATIENT)
Dept: SPORTS MEDICINE | Facility: CLINIC | Age: 47
End: 2022-01-10

## 2022-01-10 DIAGNOSIS — F41.9 ANXIETY: Primary | ICD-10-CM

## 2022-01-10 RX ORDER — BUSPIRONE HYDROCHLORIDE 5 MG/1
5 TABLET ORAL 3 TIMES DAILY
Qty: 90 TABLET | Refills: 3 | Status: SHIPPED | OUTPATIENT
Start: 2022-01-10 | End: 2022-06-13

## 2022-01-10 NOTE — TELEPHONE ENCOUNTER
Patient is calling an wanting to know if she can get anxiety medication to deal with her stress in regards to court dates and not being able to see her kids due to her ongoing Ex- issues per patient states. patient would like to get medicine sent in to the [facility] pharmacy. Please advise, thank you!

## 2022-01-18 DIAGNOSIS — E03.9 ACQUIRED HYPOTHYROIDISM: Chronic | ICD-10-CM

## 2022-01-18 RX ORDER — LEVOTHYROXINE SODIUM 0.07 MG/1
75 TABLET ORAL DAILY
Qty: 90 TABLET | Refills: 3 | Status: SHIPPED | OUTPATIENT
Start: 2022-01-18 | End: 2022-05-10 | Stop reason: SDUPTHER

## 2022-02-08 ENCOUNTER — OFFICE VISIT (OUTPATIENT)
Dept: SPORTS MEDICINE | Facility: CLINIC | Age: 47
End: 2022-02-08

## 2022-02-08 VITALS
WEIGHT: 156 LBS | SYSTOLIC BLOOD PRESSURE: 128 MMHG | OXYGEN SATURATION: 98 % | DIASTOLIC BLOOD PRESSURE: 80 MMHG | HEART RATE: 91 BPM | HEIGHT: 60 IN | BODY MASS INDEX: 30.63 KG/M2 | TEMPERATURE: 98.2 F | RESPIRATION RATE: 16 BRPM

## 2022-02-08 DIAGNOSIS — F34.1 DYSTHYMIC DISORDER: Chronic | ICD-10-CM

## 2022-02-08 DIAGNOSIS — F41.0 PANIC ANXIETY SYNDROME: Primary | ICD-10-CM

## 2022-02-08 PROCEDURE — 99214 OFFICE O/P EST MOD 30 MIN: CPT | Performed by: FAMILY MEDICINE

## 2022-02-08 RX ORDER — ALPRAZOLAM 0.5 MG/1
0.5 TABLET ORAL 2 TIMES DAILY PRN
Qty: 40 TABLET | Refills: 0 | Status: SHIPPED | OUTPATIENT
Start: 2022-02-08 | End: 2022-12-15

## 2022-02-08 NOTE — PROGRESS NOTES
Answers for HPI/ROS submitted by the patient on 1/27/2022  Please describe your symptoms.: I have been  5 years.  have co-parented well with my ex until about March 2021.  Having to go to court to enforce the 50/50 time split with the children.  Children are wanting to stay with ex more.  Try to start family counseling with children.  I currently have a therapist as does my daughter.  My son had his first session with his therapist yesterday (1-).  Had called a couple of weeks ago about my stress/anxiety level.  MD sent me rx for Buspar.  Anxiety/Stress is still extreme.  Having what I think or panic attacks.  Wanting to address maybe getting something besides Buspar to help me.  Have you had these symptoms before?: No  How long have you been having these symptoms?: Greater than 2 weeks  Please list any medications you are currently taking for this condition.: buspar 5mg TID  Please describe any probable cause for these symptoms. : stress with children, ex , court system  What is the primary reason for your visit?: Other    Chief Complaint  Anxiety    Subjective          Alyce Batista presents to [facility]  History of Present Illness  As above per patient.  She is going through a very difficult time with her ex- and parenting of the children.  This is caused extreme anxiety and the patient, now having episodes of panic attacks whenever she gets emails from her  etc.  No SI or HI.  Both she and the children are starting therapy, individually and as a group.  As of now there is a contempt order in place if ex- does not follow court orders by March 31.  Patient has great social support from her friends and family.  We tried a prescription for BuSpar which was not beneficial.        Objective   Vital Signs:   /80 (BP Location: Left arm, Patient Position: Sitting, Cuff Size: Adult)   Pulse 91   Temp 98.2 °F (36.8 °C)   Resp 16   Ht  "152.4 cm (60\")   Wt 70.8 kg (156 lb)   SpO2 98%   BMI 30.47 kg/m²     Physical Exam  Vitals reviewed.   Constitutional:       Appearance: She is well-developed.   HENT:      Head: Normocephalic and atraumatic.   Eyes:      Conjunctiva/sclera: Conjunctivae normal.      Pupils: Pupils are equal, round, and reactive to light.   Cardiovascular:      Comments: No peripheral edema  Pulmonary:      Effort: Pulmonary effort is normal.   Skin:     General: Skin is warm and dry.   Neurological:      Mental Status: She is alert and oriented to person, place, and time.   Psychiatric:         Behavior: Behavior normal.      Comments: Extremely tearful and anxious.  No SI or HI.        Result Review :                 Assessment and Plan    Diagnoses and all orders for this visit:    1. Panic anxiety syndrome (Primary)  -     ALPRAZolam (XANAX) 0.5 MG tablet; Take 1 tablet by mouth 2 (Two) Times a Day As Needed for Anxiety (panic).  Dispense: 40 tablet; Refill: 0    2. Dysthymic disorder      Cautioned the patient on the use of angiolytics, she is a pharmacist and understands hazardous potentials.  She will use this medication only as needed.  Follow-up here first week of April.      Follow Up   No follow-ups on file.  Patient was given instructions and counseling regarding her condition or for health maintenance advice. Please see specific information pulled into the AVS if appropriate.       "

## 2022-02-14 RX ORDER — SPIRONOLACTONE 50 MG/1
TABLET, FILM COATED ORAL
Qty: 180 TABLET | Refills: 3 | Status: SHIPPED | OUTPATIENT
Start: 2022-02-14 | End: 2022-05-10 | Stop reason: SDUPTHER

## 2022-04-04 ENCOUNTER — TELEPHONE (OUTPATIENT)
Dept: SPORTS MEDICINE | Facility: CLINIC | Age: 47
End: 2022-04-04

## 2022-04-08 DIAGNOSIS — I10 ESSENTIAL HYPERTENSION: ICD-10-CM

## 2022-04-11 RX ORDER — LOSARTAN POTASSIUM 100 MG/1
100 TABLET ORAL DAILY
Qty: 90 TABLET | Refills: 3 | Status: SHIPPED | OUTPATIENT
Start: 2022-04-11 | End: 2022-05-10 | Stop reason: SDUPTHER

## 2022-04-19 DIAGNOSIS — Z91.09 ENVIRONMENTAL ALLERGIES: ICD-10-CM

## 2022-04-20 RX ORDER — CHLORCYCLIZINE HYDROCHLORIDE AND PSEUDOEPHEDRINE HYDROCHLORIDE 25; 60 MG/1; MG/1
TABLET ORAL
Qty: 60 TABLET | Refills: 11 | Status: SHIPPED | OUTPATIENT
Start: 2022-04-20

## 2022-05-10 ENCOUNTER — OFFICE VISIT (OUTPATIENT)
Dept: SPORTS MEDICINE | Facility: CLINIC | Age: 47
End: 2022-05-10

## 2022-05-10 ENCOUNTER — LAB (OUTPATIENT)
Dept: LAB | Facility: HOSPITAL | Age: 47
End: 2022-05-10

## 2022-05-10 VITALS
BODY MASS INDEX: 30.82 KG/M2 | HEIGHT: 60 IN | OXYGEN SATURATION: 98 % | SYSTOLIC BLOOD PRESSURE: 116 MMHG | HEART RATE: 91 BPM | WEIGHT: 157 LBS | TEMPERATURE: 97.5 F | DIASTOLIC BLOOD PRESSURE: 62 MMHG

## 2022-05-10 DIAGNOSIS — I10 PRIMARY HYPERTENSION: Chronic | ICD-10-CM

## 2022-05-10 DIAGNOSIS — F41.0 PANIC ANXIETY SYNDROME: ICD-10-CM

## 2022-05-10 DIAGNOSIS — F34.1 DYSTHYMIC DISORDER: Primary | Chronic | ICD-10-CM

## 2022-05-10 DIAGNOSIS — E03.9 ACQUIRED HYPOTHYROIDISM: Chronic | ICD-10-CM

## 2022-05-10 DIAGNOSIS — L68.0 HIRSUTISM: Chronic | ICD-10-CM

## 2022-05-10 DIAGNOSIS — I10 ESSENTIAL HYPERTENSION: ICD-10-CM

## 2022-05-10 DIAGNOSIS — F41.9 ANXIETY: Chronic | ICD-10-CM

## 2022-05-10 LAB
ALBUMIN SERPL-MCNC: 4.6 G/DL (ref 3.5–5.2)
ALBUMIN/GLOB SERPL: 1.3 G/DL
ALP SERPL-CCNC: 88 U/L (ref 39–117)
ALT SERPL W P-5'-P-CCNC: 23 U/L (ref 1–33)
ANION GAP SERPL CALCULATED.3IONS-SCNC: 15 MMOL/L (ref 5–15)
AST SERPL-CCNC: 16 U/L (ref 1–32)
BILIRUB SERPL-MCNC: 0.2 MG/DL (ref 0–1.2)
BUN SERPL-MCNC: 14 MG/DL (ref 6–20)
BUN/CREAT SERPL: 16.7 (ref 7–25)
CALCIUM SPEC-SCNC: 9.5 MG/DL (ref 8.6–10.5)
CHLORIDE SERPL-SCNC: 99 MMOL/L (ref 98–107)
CO2 SERPL-SCNC: 22 MMOL/L (ref 22–29)
CREAT SERPL-MCNC: 0.84 MG/DL (ref 0.57–1)
EGFRCR SERPLBLD CKD-EPI 2021: 87.5 ML/MIN/1.73
GLOBULIN UR ELPH-MCNC: 3.5 GM/DL
GLUCOSE SERPL-MCNC: 120 MG/DL (ref 65–99)
POTASSIUM SERPL-SCNC: 4.3 MMOL/L (ref 3.5–5.2)
PROT SERPL-MCNC: 8.1 G/DL (ref 6–8.5)
SODIUM SERPL-SCNC: 136 MMOL/L (ref 136–145)
T4 FREE SERPL-MCNC: 1.18 NG/DL (ref 0.93–1.7)
TSH SERPL DL<=0.05 MIU/L-ACNC: 0.89 UIU/ML (ref 0.27–4.2)

## 2022-05-10 PROCEDURE — 80053 COMPREHEN METABOLIC PANEL: CPT | Performed by: FAMILY MEDICINE

## 2022-05-10 PROCEDURE — 36415 COLL VENOUS BLD VENIPUNCTURE: CPT | Performed by: FAMILY MEDICINE

## 2022-05-10 PROCEDURE — 84443 ASSAY THYROID STIM HORMONE: CPT | Performed by: FAMILY MEDICINE

## 2022-05-10 PROCEDURE — 84439 ASSAY OF FREE THYROXINE: CPT | Performed by: FAMILY MEDICINE

## 2022-05-10 PROCEDURE — 99214 OFFICE O/P EST MOD 30 MIN: CPT | Performed by: FAMILY MEDICINE

## 2022-05-10 RX ORDER — SPIRONOLACTONE 50 MG/1
50 TABLET, FILM COATED ORAL 2 TIMES DAILY
Qty: 180 TABLET | Refills: 3 | Status: SHIPPED | OUTPATIENT
Start: 2022-05-10 | End: 2022-12-15 | Stop reason: SDUPTHER

## 2022-05-10 RX ORDER — LEVOTHYROXINE SODIUM 0.07 MG/1
75 TABLET ORAL DAILY
Qty: 90 TABLET | Refills: 3 | Status: SHIPPED | OUTPATIENT
Start: 2022-05-10 | End: 2022-12-15 | Stop reason: SDUPTHER

## 2022-05-10 RX ORDER — LOSARTAN POTASSIUM 100 MG/1
100 TABLET ORAL DAILY
Qty: 90 TABLET | Refills: 3 | Status: SHIPPED | OUTPATIENT
Start: 2022-05-10 | End: 2022-12-15 | Stop reason: SDUPTHER

## 2022-05-10 NOTE — PROGRESS NOTES
"Chief Complaint  Anxiety (Following up from previous O.V from anxiety and wants to discuss transition of care to new PCP)    Subjective          Alyce Batista presents to [facility]  History of Present Illness  1.  CBT is helping her. Trying to get children and  to get more involved, but not getting much cooperation. No SI or HI  2.  FU HTN  3.  FU hypothyroidism   Objective   Vital Signs:  /62 (BP Location: Left arm, Patient Position: Sitting, Cuff Size: Adult)   Pulse 91   Temp 97.5 °F (36.4 °C) (Temporal)   Ht 152.4 cm (60\")   Wt 71.2 kg (157 lb)   SpO2 98%   BMI 30.66 kg/m²           Physical Exam  Vitals reviewed.   Constitutional:       Appearance: She is well-developed.   HENT:      Head: Normocephalic and atraumatic.   Eyes:      Conjunctiva/sclera: Conjunctivae normal.      Pupils: Pupils are equal, round, and reactive to light.   Cardiovascular:      Rate and Rhythm: Normal rate and regular rhythm.      Comments: No peripheral edema  Pulmonary:      Effort: Pulmonary effort is normal.      Breath sounds: Normal breath sounds.   Skin:     General: Skin is warm and dry.   Neurological:      Mental Status: She is alert and oriented to person, place, and time.   Psychiatric:         Behavior: Behavior normal.      Comments: No SI or HI. Mood seems batter than our last visit         Result Review :                 Assessment and Plan    Diagnoses and all orders for this visit:    1. Dysthymic disorder (Primary)    2. Anxiety    3. Panic anxiety syndrome    4. Acquired hypothyroidism  -     TSH  -     T4, Free  -     levothyroxine (SYNTHROID, LEVOTHROID) 75 MCG tablet; Take 1 tablet by mouth Daily.  Dispense: 90 tablet; Refill: 3    5. Primary hypertension  -     Comprehensive Metabolic Panel    6. Essential hypertension  -     losartan (COZAAR) 100 MG tablet; Take 1 tablet by mouth Daily.  Dispense: 90 tablet; Refill: 3    7. Hirsutism  -     spironolactone " (ALDACTONE) 50 MG tablet; Take 1 tablet by mouth 2 (Two) Times a Day.  Dispense: 180 tablet; Refill: 3    Check CMP to make sure K+ is ok  Will continue with current medications. Continue CBT       Follow Up   No follow-ups on file.  Patient was given instructions and counseling regarding her condition or for health maintenance advice. Please see specific information pulled into the AVS if appropriate.

## 2022-06-13 ENCOUNTER — PATIENT ROUNDING (BHMG ONLY) (OUTPATIENT)
Dept: FAMILY MEDICINE CLINIC | Facility: CLINIC | Age: 47
End: 2022-06-13

## 2022-06-13 ENCOUNTER — OFFICE VISIT (OUTPATIENT)
Dept: FAMILY MEDICINE CLINIC | Facility: CLINIC | Age: 47
End: 2022-06-13

## 2022-06-13 VITALS
WEIGHT: 161.4 LBS | TEMPERATURE: 96.7 F | BODY MASS INDEX: 31.69 KG/M2 | SYSTOLIC BLOOD PRESSURE: 116 MMHG | HEIGHT: 60 IN | HEART RATE: 85 BPM | OXYGEN SATURATION: 96 % | RESPIRATION RATE: 16 BRPM | DIASTOLIC BLOOD PRESSURE: 78 MMHG

## 2022-06-13 DIAGNOSIS — I10 HYPERTENSION, UNSPECIFIED TYPE: Primary | Chronic | ICD-10-CM

## 2022-06-13 DIAGNOSIS — E03.9 ACQUIRED HYPOTHYROIDISM: Chronic | ICD-10-CM

## 2022-06-13 DIAGNOSIS — Z12.11 SCREEN FOR COLON CANCER: ICD-10-CM

## 2022-06-13 DIAGNOSIS — F41.9 ANXIETY: ICD-10-CM

## 2022-06-13 PROCEDURE — 99214 OFFICE O/P EST MOD 30 MIN: CPT | Performed by: NURSE PRACTITIONER

## 2022-06-13 RX ORDER — PREDNISONE 20 MG/1
TABLET ORAL
COMMUNITY
Start: 2022-06-08 | End: 2022-06-13

## 2022-06-13 RX ORDER — FLUOXETINE HYDROCHLORIDE 20 MG/1
20 CAPSULE ORAL 2 TIMES DAILY
Qty: 180 CAPSULE | Refills: 1 | Status: SHIPPED | OUTPATIENT
Start: 2022-06-13 | End: 2022-12-15 | Stop reason: SDUPTHER

## 2022-06-13 NOTE — PROGRESS NOTES
A My-Chart message has been sent to the patient for PATIENT ROUNDING with [facility]

## 2022-06-13 NOTE — PATIENT INSTRUCTIONS
Discharge instructions  Continue present plan lots of vegetables chicken fish 64 ounces water daily greatly reduce breads pastas and sweets,  Outpatient colonoscopy sometime the next 6 months  Continue present medication lungs are doing well follow-up in 6 months with fasting labs prior to next appointment please,

## 2022-06-13 NOTE — PROGRESS NOTES
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  Vaccinated for COVID she has not had COVID.          Objective   Vital Signs:  /78 (BP Location: Right arm, Patient Position: Sitting, Cuff Size: Small Adult)   Pulse 85   Temp 96.7 °F (35.9 °C) (Temporal)   Resp 16   Ht 152.4 cm (60\")   Wt 73.2 kg (161 lb 6.4 oz)   SpO2 96%   BMI 31.52 kg/m²   Estimated body mass index is 31.52 kg/m² as calculated from the following:    Height as of this encounter: 152.4 cm (60\").    Weight as of this encounter: 73.2 kg (161 lb 6.4 oz).          Physical Exam  Vitals reviewed.   Constitutional:       General: She is not in acute distress.     Appearance: She is well-developed. She is not ill-appearing, toxic-appearing or diaphoretic.   HENT:      Head: Normocephalic.      Nose: Nose normal.   Eyes:      General: No scleral icterus.     Conjunctiva/sclera: Conjunctivae normal.      Pupils: Pupils are equal, round, and reactive to light.   Neck:      Thyroid: No thyromegaly.      Vascular: No JVD.   Cardiovascular:      Rate and Rhythm: Normal rate and regular rhythm.      Heart sounds: Normal heart sounds. No murmur heard.    No friction rub. No gallop.   Pulmonary:      Effort: Pulmonary effort is normal. No respiratory distress.      Breath sounds: Normal breath sounds. No stridor. No wheezing or rales.   Abdominal:      General: Bowel " sounds are normal. There is no distension.      Palpations: Abdomen is soft.      Tenderness: There is no abdominal tenderness.      Comments: No hepatosplenomegaly, no ascites,   Musculoskeletal:         General: No tenderness.      Cervical back: Neck supple.   Lymphadenopathy:      Cervical: No cervical adenopathy.   Skin:     General: Skin is warm and dry.      Findings: No erythema or rash.   Neurological:      Mental Status: She is alert and oriented to person, place, and time.      Deep Tendon Reflexes: Reflexes are normal and symmetric.   Psychiatric:         Behavior: Behavior normal.         Thought Content: Thought content normal.         Judgment: Judgment normal.        Result Review :                Assessment and Plan   Diagnoses and all orders for this visit:    1. Hypertension, unspecified type (Primary)    2. Acquired hypothyroidism    3. Screen for colon cancer  -     Ambulatory Referral For Screening Colonoscopy    4. Anxiety    Other orders  -     FLUoxetine (PROzac) 20 MG capsule; Take 1 capsule by mouth 2 (Two) Times a Day.  Dispense: 180 capsule; Refill: 1             Follow Up   Return in about 6 months (around 12/13/2022), or Follow-up 6 months with fasting lab.  Okay.  Patient was given instructions and counseling regarding her condition or for health maintenance advice. Please see specific information pulled into the AVS if appropriate.     Discharge instructions  Continue present plan lots of vegetables chicken fish 64 ounces water daily greatly reduce breads pastas and sweets,  Outpatient colonoscopy sometime the next 6 months  Continue present medication lungs are doing well follow-up in 6 months with fasting labs prior to next appointment please,

## 2022-06-27 ENCOUNTER — PRE-PROCEDURE SCREENING (OUTPATIENT)
Dept: GASTROENTEROLOGY | Facility: CLINIC | Age: 47
End: 2022-06-27

## 2022-08-01 ENCOUNTER — PREP FOR SURGERY (OUTPATIENT)
Dept: OTHER | Facility: HOSPITAL | Age: 47
End: 2022-08-01

## 2022-08-01 DIAGNOSIS — Z12.11 ENCOUNTER FOR SCREENING FOR MALIGNANT NEOPLASM OF COLON: Primary | ICD-10-CM

## 2022-08-01 RX ORDER — SODIUM CHLORIDE, SODIUM LACTATE, POTASSIUM CHLORIDE, CALCIUM CHLORIDE 600; 310; 30; 20 MG/100ML; MG/100ML; MG/100ML; MG/100ML
30 INJECTION, SOLUTION INTRAVENOUS CONTINUOUS
Status: CANCELLED | OUTPATIENT
Start: 2022-11-23

## 2022-09-22 ENCOUNTER — TELEPHONE (OUTPATIENT)
Dept: GASTROENTEROLOGY | Facility: CLINIC | Age: 47
End: 2022-09-22

## 2022-09-22 NOTE — TELEPHONE ENCOUNTER
Hub staff attempted to follow warm transfer process and was unsuccessful     Caller: Alyce Batista    Relationship to patient: Self    Best call back number: [phone]    Patient is needing: WANTS TO SCHEDULED COLONOSCOPY FOR WEEK OF DECEMBER 5-9 WHILE ON VACTION    ROUTINE CHECK - AGE

## 2022-09-29 ENCOUNTER — TELEPHONE (OUTPATIENT)
Dept: GASTROENTEROLOGY | Facility: CLINIC | Age: 47
End: 2022-09-29

## 2022-09-29 PROBLEM — Z12.11 ENCOUNTER FOR SCREENING FOR MALIGNANT NEOPLASM OF COLON: Status: ACTIVE | Noted: 2022-09-29

## 2022-09-29 NOTE — TELEPHONE ENCOUNTER
HANNAH Marmolejo for COLONOSCOPY  on 11/23/22 arrive at 12pm.Prep instructions given to pt at office.

## 2022-09-29 NOTE — TELEPHONE ENCOUNTER
Caller: Alyce Batista    Relationship to patient: Self    Best call back number:[phone]    Chief complaint: PT WANTS TO SCHEDULE COLONOSCOPY    Type of visit: COLONOSCOPY    Requested date: FIRST AVAILABLE

## 2022-11-23 ENCOUNTER — ANESTHESIA EVENT (OUTPATIENT)
Dept: GASTROENTEROLOGY | Facility: HOSPITAL | Age: 47
End: 2022-11-23

## 2022-11-23 ENCOUNTER — HOSPITAL ENCOUNTER (OUTPATIENT)
Facility: HOSPITAL | Age: 47
Setting detail: HOSPITAL OUTPATIENT SURGERY
Discharge: HOME | End: 2022-11-23
Attending: INTERNAL MEDICINE | Admitting: INTERNAL MEDICINE

## 2022-11-23 ENCOUNTER — ANESTHESIA (OUTPATIENT)
Dept: GASTROENTEROLOGY | Facility: HOSPITAL | Age: 47
End: 2022-11-23

## 2022-11-23 VITALS
SYSTOLIC BLOOD PRESSURE: 128 MMHG | BODY MASS INDEX: 31.02 KG/M2 | DIASTOLIC BLOOD PRESSURE: 88 MMHG | RESPIRATION RATE: 13 BRPM | WEIGHT: 158 LBS | OXYGEN SATURATION: 98 % | HEART RATE: 70 BPM | HEIGHT: 60 IN

## 2022-11-23 DIAGNOSIS — Z12.11 ENCOUNTER FOR SCREENING FOR MALIGNANT NEOPLASM OF COLON: ICD-10-CM

## 2022-11-23 LAB
B-HCG UR QL: NEGATIVE
EXPIRATION DATE: NORMAL
INTERNAL NEGATIVE CONTROL: NEGATIVE
INTERNAL POSITIVE CONTROL: POSITIVE
Lab: NORMAL

## 2022-11-23 PROCEDURE — 81025 URINE PREGNANCY TEST: CPT | Performed by: INTERNAL MEDICINE

## 2022-11-23 PROCEDURE — 25010000002 PROPOFOL 10 MG/ML EMULSION

## 2022-11-23 PROCEDURE — 88305 TISSUE EXAM BY PATHOLOGIST: CPT | Performed by: INTERNAL MEDICINE

## 2022-11-23 PROCEDURE — 45385 COLONOSCOPY W/LESION REMOVAL: CPT | Performed by: INTERNAL MEDICINE

## 2022-11-23 RX ORDER — LIDOCAINE HYDROCHLORIDE 20 MG/ML
INJECTION, SOLUTION INFILTRATION; PERINEURAL AS NEEDED
Status: DISCONTINUED | OUTPATIENT
Start: 2022-11-23 | End: 2022-11-23 | Stop reason: SURG

## 2022-11-23 RX ORDER — PROPOFOL 10 MG/ML
VIAL (ML) INTRAVENOUS CONTINUOUS PRN
Status: DISCONTINUED | OUTPATIENT
Start: 2022-11-23 | End: 2022-11-23 | Stop reason: SURG

## 2022-11-23 RX ORDER — SODIUM CHLORIDE, SODIUM LACTATE, POTASSIUM CHLORIDE, CALCIUM CHLORIDE 600; 310; 30; 20 MG/100ML; MG/100ML; MG/100ML; MG/100ML
30 INJECTION, SOLUTION INTRAVENOUS CONTINUOUS PRN
Status: DISCONTINUED | OUTPATIENT
Start: 2022-11-23 | End: 2022-11-23 | Stop reason: HOSPADM

## 2022-11-23 RX ORDER — PROMETHAZINE HYDROCHLORIDE 25 MG/1
25 TABLET ORAL ONCE AS NEEDED
Status: DISCONTINUED | OUTPATIENT
Start: 2022-11-23 | End: 2022-11-23 | Stop reason: HOSPADM

## 2022-11-23 RX ORDER — PROPOFOL 10 MG/ML
VIAL (ML) INTRAVENOUS AS NEEDED
Status: DISCONTINUED | OUTPATIENT
Start: 2022-11-23 | End: 2022-11-23 | Stop reason: SURG

## 2022-11-23 RX ORDER — SODIUM CHLORIDE, SODIUM LACTATE, POTASSIUM CHLORIDE, CALCIUM CHLORIDE 600; 310; 30; 20 MG/100ML; MG/100ML; MG/100ML; MG/100ML
30 INJECTION, SOLUTION INTRAVENOUS CONTINUOUS
Status: DISCONTINUED | OUTPATIENT
Start: 2022-11-23 | End: 2022-11-23

## 2022-11-23 RX ORDER — PROMETHAZINE HYDROCHLORIDE 25 MG/1
25 SUPPOSITORY RECTAL ONCE AS NEEDED
Status: DISCONTINUED | OUTPATIENT
Start: 2022-11-23 | End: 2022-11-23 | Stop reason: HOSPADM

## 2022-11-23 RX ADMIN — PROPOFOL 50 MG: 10 INJECTION, EMULSION INTRAVENOUS at 13:01

## 2022-11-23 RX ADMIN — LIDOCAINE HYDROCHLORIDE 60 MG: 20 INJECTION, SOLUTION INFILTRATION; PERINEURAL at 13:01

## 2022-11-23 RX ADMIN — Medication 200 MCG/KG/MIN: at 13:02

## 2022-11-23 RX ADMIN — SODIUM CHLORIDE, POTASSIUM CHLORIDE, SODIUM LACTATE AND CALCIUM CHLORIDE 30 ML/HR: 600; 310; 30; 20 INJECTION, SOLUTION INTRAVENOUS at 12:15

## 2022-11-23 NOTE — H&P
[facility]  Pre Procedure History & Physical    Chief Complaint:   Time for my colonoscopy    Subjective     HPI:   47 y.o. female presenting to endoscopy unit today for screening colonoscopy.    Past Medical History:   Past Medical History:   Diagnosis Date   • Acquired hypothyroidism    • Allergic     seasonal   • Anxiety    • Concussion    • Depression    • Dysthymia     ANXIETY, PANIC ATTACKS   • Head injury    • Hirsutism    • Hypertension    • Memory loss    • Migraine        Family History:  Family History   Problem Relation Age of Onset   • Hypertension Mother    • Migraines Mother    • Hyperlipidemia Father    • Hypertension Father    • Diabetes Maternal Grandmother    • Hypertension Maternal Grandfather    • Ataxia Paternal Grandmother    • Dementia Paternal Grandmother    • Parkinsonism Paternal Grandmother    • Arthritis Paternal Grandfather    • Ataxia Paternal Grandfather    • Neuropathy Paternal Grandfather    • Malig Hyperthermia Neg Hx        Social History:   reports that she has never smoked. She has never used smokeless tobacco. She reports current alcohol use. She reports that she does not use drugs.    Medications:   Medications Prior to Admission   Medication Sig Dispense Refill Last Dose   • Calcium Carb-Cholecalciferol 600-200 MG-UNIT tablet Take 1 tablet by mouth Daily.   Past Week   • FLUoxetine (PROzac) 20 MG capsule Take 1 capsule by mouth 2 (Two) Times a Day. 180 capsule 1 11/21/2022   • levothyroxine (SYNTHROID, LEVOTHROID) 75 MCG tablet Take 1 tablet by mouth Daily. 90 tablet 3 11/21/2022   • losartan (COZAAR) 100 MG tablet Take 1 tablet by mouth Daily. 90 tablet 3 11/22/2022   • montelukast (SINGULAIR) 10 MG tablet Take 10 mg by mouth Every Night.   11/21/2022   • Probiotic Product (PROBIOTIC PO) Take 1 tablet by mouth Daily.   Past Week   • spironolactone (ALDACTONE) 50 MG tablet Take 1 tablet by mouth 2 (Two) Times a Day. 180 tablet 3 11/21/2022   • Stahist  "AD 25-60 MG tablet TAKE 1 TABLET BY MOUTH TWICE DAILY (Patient taking differently: Take 1 tablet by mouth Daily As Needed.) 60 tablet 11 Past Week   • ALPRAZolam (XANAX) 0.5 MG tablet Take 1 tablet by mouth 2 (Two) Times a Day As Needed for Anxiety (panic). 40 tablet 0 More than a month   • triamcinolone (KENALOG) 0.1 % cream Apply 1 application topically to the appropriate area as directed Daily As Needed.  6 More than a month       Allergies:  Patient has no known allergies.    ROS:    Pertinent items are noted in HPI     Objective     Blood pressure 137/97, pulse 92, resp. rate 18, height 152.4 cm (60\"), weight 71.7 kg (158 lb), SpO2 97 %.    Physical Exam   Constitutional: Pt is oriented to person, place, and time and well-developed, well-nourished, and in no distress.   Abdominal: Soft.   Psychiatric: Mood, memory, affect and judgment normal.     Assessment & Plan     Diagnosis:  Encounter for screening for colon cancer    Anticipated Surgical Procedure:  Colonoscopy    The risks, benefits, and alternatives of this procedure have been discussed with the patient or the responsible party- the patient understands and agrees to proceed.                                                                "

## 2022-11-23 NOTE — ANESTHESIA PREPROCEDURE EVALUATION
Anesthesia Evaluation     Patient summary reviewed and Nursing notes reviewed   NPO Solid Status: > 8 hours  NPO Liquid Status: > 4 hours           Airway   Mallampati: II  Neck ROM: full  No difficulty expected  Dental - normal exam     Pulmonary     breath sounds clear to auscultation  Cardiovascular     Rhythm: regular    (+) hypertension,       Neuro/Psych  (+) headaches, psychiatric history Anxiety,    GI/Hepatic/Renal/Endo    (+)   thyroid problem hypothyroidism    Musculoskeletal     Abdominal   (+) obese,    Substance History      OB/GYN          Other                        Anesthesia Plan    ASA 2     MAC     intravenous induction     Anesthetic plan, risks, benefits, and alternatives have been provided, discussed and informed consent has been obtained with: patient.        CODE STATUS:

## 2022-11-23 NOTE — ANESTHESIA POSTPROCEDURE EVALUATION
"Patient: Alyce Batista    Procedure Summary     Date: 11/23/22 Room / Location:  [facility] /  [facility]    Anesthesia Start: 1258 Anesthesia Stop: 1325    Procedure: COLONOSCOPY TO CECUM WITH COLD SNARE POLYPECTOMY Diagnosis:       Encounter for screening for malignant neoplasm of colon      (Encounter for screening for malignant neoplasm of colon [Z12.11])    Surgeons: Jayesh Head MD Provider: Yovani Thomas MD    Anesthesia Type: MAC ASA Status: 2          Anesthesia Type: MAC    Vitals  Vitals Value Taken Time   /88 11/23/22 1347   Temp     Pulse 70 11/23/22 1348   Resp 13 11/23/22 1347   SpO2 98 % 11/23/22 1348   Vitals shown include unvalidated device data.        Post Anesthesia Care and Evaluation    Patient location during evaluation: bedside  Patient participation: complete - patient participated  Level of consciousness: sleepy but conscious  Pain score: 0  Pain management: adequate    Airway patency: patent  Anesthetic complications: No anesthetic complications    Cardiovascular status: acceptable  Respiratory status: acceptable  Hydration status: acceptable    Comments: /88 (BP Location: Left arm, Patient Position: Lying)   Pulse 70   Resp 13   Ht 152.4 cm (60\")   Wt 71.7 kg (158 lb)   SpO2 98%   BMI 30.86 kg/m²         "

## 2022-11-23 NOTE — DISCHARGE INSTRUCTIONS

## 2022-11-25 LAB
LAB AP CASE REPORT: NORMAL
PATH REPORT.FINAL DX SPEC: NORMAL
PATH REPORT.GROSS SPEC: NORMAL

## 2022-12-05 ENCOUNTER — TELEPHONE (OUTPATIENT)
Dept: GASTROENTEROLOGY | Facility: CLINIC | Age: 47
End: 2022-12-05

## 2022-12-05 NOTE — TELEPHONE ENCOUNTER
----- Message from Jayesh Head MD sent at 11/29/2022  8:43 AM EST -----  The polyp(s) showed hyperplastic change, which is non-cancerous and not pre-cancerous. Follow-up colonoscopy in 10 years is advised.

## 2022-12-15 ENCOUNTER — OFFICE VISIT (OUTPATIENT)
Dept: FAMILY MEDICINE CLINIC | Facility: CLINIC | Age: 47
End: 2022-12-15

## 2022-12-15 VITALS
BODY MASS INDEX: 31.18 KG/M2 | TEMPERATURE: 96.8 F | DIASTOLIC BLOOD PRESSURE: 68 MMHG | OXYGEN SATURATION: 98 % | WEIGHT: 158.8 LBS | HEIGHT: 60 IN | HEART RATE: 94 BPM | SYSTOLIC BLOOD PRESSURE: 144 MMHG | RESPIRATION RATE: 12 BRPM

## 2022-12-15 DIAGNOSIS — I10 ESSENTIAL HYPERTENSION: ICD-10-CM

## 2022-12-15 DIAGNOSIS — L68.0 HIRSUTISM: Chronic | ICD-10-CM

## 2022-12-15 DIAGNOSIS — E03.9 ACQUIRED HYPOTHYROIDISM: Chronic | ICD-10-CM

## 2022-12-15 DIAGNOSIS — Z00.00 HEALTH MAINTENANCE EXAMINATION: ICD-10-CM

## 2022-12-15 DIAGNOSIS — I10 HYPERTENSION, UNSPECIFIED TYPE: Primary | Chronic | ICD-10-CM

## 2022-12-15 PROCEDURE — 99213 OFFICE O/P EST LOW 20 MIN: CPT | Performed by: NURSE PRACTITIONER

## 2022-12-15 RX ORDER — LEVOTHYROXINE SODIUM 0.07 MG/1
75 TABLET ORAL DAILY
Qty: 90 TABLET | Refills: 3 | Status: SHIPPED | OUTPATIENT
Start: 2022-12-15

## 2022-12-15 RX ORDER — SPIRONOLACTONE 50 MG/1
50 TABLET, FILM COATED ORAL 2 TIMES DAILY
Qty: 180 TABLET | Refills: 3 | Status: SHIPPED | OUTPATIENT
Start: 2022-12-15

## 2022-12-15 RX ORDER — LOSARTAN POTASSIUM 100 MG/1
100 TABLET ORAL DAILY
Qty: 90 TABLET | Refills: 3 | Status: SHIPPED | OUTPATIENT
Start: 2022-12-15

## 2022-12-15 RX ORDER — FLUOXETINE HYDROCHLORIDE 20 MG/1
20 CAPSULE ORAL 2 TIMES DAILY
Qty: 180 CAPSULE | Refills: 1 | Status: SHIPPED | OUTPATIENT
Start: 2022-12-15 | End: 2022-12-30

## 2022-12-15 NOTE — PATIENT INSTRUCTIONS
Continue present care healthy diet exercise try to decrease calories  Another 100 or so daily and over a period of 6 to 12 months   will likely be down another 5 pounds or so  64 ounces water daily follow-up 6 months with fasting lab visit continue check blood pressure at least weekly should be less than 130/80 on average    Okay to alternate between 40 mg and 20 mg fluoxetine daily to equal a steady state of 30 mg daily as it has a long half-life  Okay to decrease dose every 6 to 8 weeks as long as you are doing well let me know if having difficulties if increased anxiety or rebound back to the previous dose.

## 2022-12-15 NOTE — PROGRESS NOTES
"Chief Complaint  Hypertension (6 MONTH F/U)    Subjective        Alyce Batista presents to [facility]  History of Present Illness  Very pleasant patient here today follow-up essential hypertension controlled checks it at home is little high today acquired hypothyroid compliant with her medication for Synthroid  Take spironolactone for antiandrogen effects and some mild here to some sees GYN to manage this  No problems the potassium and renal function and she is aware she must drink plenty water monitor these  Chronic anxiety when taking Prozac for at least 20 years with the divorce previously everything is going well now she has 2 teenagers healthy.  Thinks it may attribute to her appetite increase would like to decrease this or see if she can wean off but no problems with the medication anxiety is controlled nicely.        Hypertension        Objective   Vital Signs:  /68   Pulse 94   Temp 96.8 °F (36 °C) (Infrared)   Resp 12   Ht 152.4 cm (60\")   Wt 72 kg (158 lb 12.8 oz)   SpO2 98%   BMI 31.01 kg/m²   Estimated body mass index is 31.01 kg/m² as calculated from the following:    Height as of this encounter: 152.4 cm (60\").    Weight as of this encounter: 72 kg (158 lb 12.8 oz).          Physical Exam  Vitals reviewed.   Constitutional:       Appearance: Normal appearance. She is well-developed. She is not ill-appearing or diaphoretic.   HENT:      Head: Normocephalic.      Comments: Thyroid normal     Nose: Nose normal.   Eyes:      General: No scleral icterus.     Conjunctiva/sclera: Conjunctivae normal.      Pupils: Pupils are equal, round, and reactive to light.   Neck:      Thyroid: No thyromegaly.      Vascular: No JVD.   Cardiovascular:      Rate and Rhythm: Normal rate and regular rhythm.      Heart sounds: Normal heart sounds. No murmur heard.    No friction rub. No gallop.   Pulmonary:      Effort: Pulmonary effort is normal. No respiratory distress.      Breath " sounds: Normal breath sounds. No stridor. No wheezing or rales.   Abdominal:      General: Bowel sounds are normal. There is no distension.      Palpations: Abdomen is soft.      Tenderness: There is no abdominal tenderness.      Comments: No hepatosplenomegaly, no ascites,   Musculoskeletal:         General: No tenderness.      Cervical back: Neck supple.   Lymphadenopathy:      Cervical: No cervical adenopathy.   Skin:     General: Skin is warm and dry.      Capillary Refill: Capillary refill takes less than 2 seconds.      Findings: No erythema or rash.   Neurological:      General: No focal deficit present.      Mental Status: She is alert and oriented to person, place, and time.      Deep Tendon Reflexes: Reflexes are normal and symmetric.   Psychiatric:         Mood and Affect: Mood normal.         Behavior: Behavior normal.         Thought Content: Thought content normal.         Judgment: Judgment normal.        Result Review :                Assessment and Plan   Diagnoses and all orders for this visit:    1. Hypertension, unspecified type (Primary)  -     CBC & Differential; Future  -     Comprehensive Metabolic Panel; Future  -     Lipid Panel With LDL / HDL Ratio; Future  -     TSH; Future  -     Urinalysis With Microscopic If Indicated (No Culture) - Urine, Clean Catch; Future    2. Acquired hypothyroidism  -     levothyroxine (SYNTHROID, LEVOTHROID) 75 MCG tablet; Take 1 tablet by mouth Daily.  Dispense: 90 tablet; Refill: 3  -     CBC & Differential; Future  -     Comprehensive Metabolic Panel; Future  -     Lipid Panel With LDL / HDL Ratio; Future  -     TSH; Future  -     Urinalysis With Microscopic If Indicated (No Culture) - Urine, Clean Catch; Future    3. Essential hypertension  -     losartan (COZAAR) 100 MG tablet; Take 1 tablet by mouth Daily.  Dispense: 90 tablet; Refill: 3  -     CBC & Differential; Future  -     Comprehensive Metabolic Panel; Future  -     Lipid Panel With LDL / HDL Ratio;  Future  -     TSH; Future  -     Urinalysis With Microscopic If Indicated (No Culture) - Urine, Clean Catch; Future    4. Hirsutism  -     spironolactone (ALDACTONE) 50 MG tablet; Take 1 tablet by mouth 2 (Two) Times a Day.  Dispense: 180 tablet; Refill: 3  -     CBC & Differential; Future  -     Comprehensive Metabolic Panel; Future  -     Lipid Panel With LDL / HDL Ratio; Future  -     TSH; Future  -     Urinalysis With Microscopic If Indicated (No Culture) - Urine, Clean Catch; Future    5. Health maintenance examination  -     CBC & Differential; Future  -     Comprehensive Metabolic Panel; Future  -     Lipid Panel With LDL / HDL Ratio; Future  -     TSH; Future  -     Urinalysis With Microscopic If Indicated (No Culture) - Urine, Clean Catch; Future    Other orders  -     FLUoxetine (PROzac) 20 MG capsule; Take 1 capsule by mouth 2 (Two) Times a Day.  Dispense: 180 capsule; Refill: 1             Follow Up   Return in about 6 months (around 6/15/2023), or lab 6 mos.  Patient was given instructions and counseling regarding her condition or for health maintenance advice. Please see specific information pulled into the AVS if appropriate.     Patient Instructions   Continue present care healthy diet exercise try to decrease calories  Another 100 or so daily and over a period of 6 to 12 months   will likely be down another 5 pounds or so  64 ounces water daily follow-up 6 months with fasting lab visit continue check blood pressure at least weekly should be less than 130/80 on average    Okay to alternate between 40 mg and 20 mg fluoxetine daily to equal a steady state of 30 mg daily as it has a long half-life  Okay to decrease dose every 6 to 8 weeks as long as you are doing well let me know if having difficulties if increased anxiety or rebound back to the previous dose.

## 2022-12-28 ENCOUNTER — TELEPHONE (OUTPATIENT)
Dept: GASTROENTEROLOGY | Facility: CLINIC | Age: 47
End: 2022-12-28

## 2022-12-30 RX ORDER — FLUOXETINE HYDROCHLORIDE 20 MG/1
CAPSULE ORAL
Qty: 180 CAPSULE | Refills: 1 | Status: SHIPPED | OUTPATIENT
Start: 2022-12-30

## 2022-12-30 NOTE — TELEPHONE ENCOUNTER
Rx Refill Note  Requested Prescriptions     Pending Prescriptions Disp Refills   • FLUoxetine (PROzac) 20 MG capsule [Pharmacy Med Name: FLUoxetine HCl 20 MG Oral Capsule] 180 capsule 3     Sig: TAKE 1 CAPSULE BY MOUTH  TWICE DAILY      Last office visit with prescribing clinician: 12/15/2022   Last telemedicine visit with prescribing clinician: 6/12/2023   Next office visit with prescribing clinician: 6/15/2023                         Would you like a call back once the refill request has been completed: [] Yes [] No    If the office needs to give you a call back, can they leave a voicemail: [] Yes [] No    Karyn Jimenez Rep  12/30/22, 11:55 EST

## 2023-04-13 DIAGNOSIS — L68.0 HIRSUTISM: Chronic | ICD-10-CM

## 2023-04-14 RX ORDER — SPIRONOLACTONE 50 MG/1
TABLET, FILM COATED ORAL
Qty: 180 TABLET | Refills: 3 | OUTPATIENT
Start: 2023-04-14

## 2023-04-27 DIAGNOSIS — Z91.09 ENVIRONMENTAL ALLERGIES: ICD-10-CM

## 2023-04-27 RX ORDER — CHLORCYCLIZINE HYDROCHLORIDE AND PSEUDOEPHEDRINE HYDROCHLORIDE 25; 60 MG/1; MG/1
TABLET ORAL
Qty: 60 TABLET | Refills: 11 | OUTPATIENT
Start: 2023-04-27

## 2023-05-02 DIAGNOSIS — Z91.09 ENVIRONMENTAL ALLERGIES: ICD-10-CM

## 2023-05-02 RX ORDER — CHLORCYCLIZINE HYDROCHLORIDE AND PSEUDOEPHEDRINE HYDROCHLORIDE 25; 60 MG/1; MG/1
1 TABLET ORAL 2 TIMES DAILY PRN
Qty: 60 TABLET | Refills: 5 | Status: SHIPPED | OUTPATIENT
Start: 2023-05-02

## 2023-05-02 RX ORDER — CHLORCYCLIZINE HYDROCHLORIDE AND PSEUDOEPHEDRINE HYDROCHLORIDE 25; 60 MG/1; MG/1
1 TABLET ORAL 2 TIMES DAILY
Qty: 60 TABLET | Refills: 11 | Status: CANCELLED | OUTPATIENT
Start: 2023-05-02

## 2023-05-02 NOTE — TELEPHONE ENCOUNTER
Rx Refill Note  Requested Prescriptions     Pending Prescriptions Disp Refills   • Chlorcyclizine-Pseudoephed (Stahist AD) 25-60 MG tablet 60 tablet 11     Sig: Take 1 tablet by mouth 2 (Two) Times a Day.      Last office visit with prescribing clinician: 12/15/2022   Last telemedicine visit with prescribing clinician: 6/12/2023   Next office visit with prescribing clinician: 6/15/2023                         Would you like a call back once the refill request has been completed: [] Yes [] No    If the office needs to give you a call back, can they leave a voicemail: [] Yes [] No    Karyn Jimenez Rep  05/02/23, 11:13 EDT

## 2023-05-31 DIAGNOSIS — Z91.09 ENVIRONMENTAL ALLERGIES: ICD-10-CM

## 2023-05-31 RX ORDER — CHLORCYCLIZINE HYDROCHLORIDE AND PSEUDOEPHEDRINE HYDROCHLORIDE 25; 60 MG/1; MG/1
1 TABLET ORAL 2 TIMES DAILY
Qty: 60 TABLET | Refills: 11 | Status: SHIPPED | OUTPATIENT
Start: 2023-05-31

## 2023-05-31 NOTE — TELEPHONE ENCOUNTER
Rx Refill Note  Requested Prescriptions     Pending Prescriptions Disp Refills   • Chlorcyclizine-Pseudoephed (Stahist AD) 25-60 MG tablet 60 tablet 11     Sig: Take 1 tablet by mouth 2 (Two) Times a Day.      Last office visit with prescribing clinician: 12/15/2022   Last telemedicine visit with prescribing clinician: Visit date not found   Next office visit with prescribing clinician: 6/15/2023                         Would you like a call back once the refill request has been completed: [] Yes [] No    If the office needs to give you a call back, can they leave a voicemail: [] Yes [] No    Karyn Jimenez Rep  05/31/23, 14:50 EDT

## 2023-06-04 DIAGNOSIS — I10 ESSENTIAL HYPERTENSION: ICD-10-CM

## 2023-06-05 RX ORDER — LOSARTAN POTASSIUM 100 MG/1
100 TABLET ORAL DAILY
Qty: 90 TABLET | Refills: 3 | OUTPATIENT
Start: 2023-06-05

## 2023-06-15 ENCOUNTER — OFFICE VISIT (OUTPATIENT)
Dept: FAMILY MEDICINE CLINIC | Facility: CLINIC | Age: 48
End: 2023-06-15
Payer: COMMERCIAL

## 2023-06-15 VITALS
HEART RATE: 95 BPM | DIASTOLIC BLOOD PRESSURE: 86 MMHG | HEIGHT: 60 IN | WEIGHT: 160 LBS | TEMPERATURE: 96 F | OXYGEN SATURATION: 96 % | BODY MASS INDEX: 31.41 KG/M2 | SYSTOLIC BLOOD PRESSURE: 139 MMHG

## 2023-06-15 DIAGNOSIS — E03.9 ACQUIRED HYPOTHYROIDISM: Chronic | ICD-10-CM

## 2023-06-15 DIAGNOSIS — I10 HYPERTENSION, UNSPECIFIED TYPE: Primary | Chronic | ICD-10-CM

## 2023-06-15 DIAGNOSIS — I10 ESSENTIAL HYPERTENSION: ICD-10-CM

## 2023-06-15 DIAGNOSIS — L68.0 HIRSUTISM: Chronic | ICD-10-CM

## 2023-06-15 PROCEDURE — 99214 OFFICE O/P EST MOD 30 MIN: CPT | Performed by: NURSE PRACTITIONER

## 2023-06-15 RX ORDER — METHYLPREDNISOLONE 4 MG/1
TABLET ORAL
Qty: 21 TABLET | Refills: 0 | Status: SHIPPED | OUTPATIENT
Start: 2023-06-15

## 2023-06-15 RX ORDER — FLUOXETINE HYDROCHLORIDE 20 MG/1
20 CAPSULE ORAL 2 TIMES DAILY
Qty: 180 CAPSULE | Refills: 2 | Status: SHIPPED | OUTPATIENT
Start: 2023-06-15

## 2023-06-15 RX ORDER — CHLORCYCLIZINE HYDROCHLORIDE AND PSEUDOEPHEDRINE HYDROCHLORIDE 25; 60 MG/1; MG/1
1 TABLET ORAL 2 TIMES DAILY PRN
Qty: 60 TABLET | Refills: 5 | Status: SHIPPED | OUTPATIENT
Start: 2023-06-15

## 2023-06-15 RX ORDER — FLUOXETINE HYDROCHLORIDE 20 MG/1
20 CAPSULE ORAL 2 TIMES DAILY
Qty: 180 CAPSULE | Refills: 2 | Status: SHIPPED | OUTPATIENT
Start: 2023-06-15 | End: 2023-06-15 | Stop reason: SDUPTHER

## 2023-06-15 RX ORDER — METHYLPREDNISOLONE 4 MG/1
TABLET ORAL
Qty: 21 TABLET | Refills: 0 | Status: SHIPPED | OUTPATIENT
Start: 2023-06-15 | End: 2023-06-15 | Stop reason: SDUPTHER

## 2023-06-15 RX ORDER — SPIRONOLACTONE 50 MG/1
50 TABLET, FILM COATED ORAL 2 TIMES DAILY
Qty: 180 TABLET | Refills: 2 | Status: SHIPPED | OUTPATIENT
Start: 2023-06-15

## 2023-06-15 RX ORDER — LOSARTAN POTASSIUM 100 MG/1
100 TABLET ORAL DAILY
Qty: 90 TABLET | Refills: 3 | Status: SHIPPED | OUTPATIENT
Start: 2023-06-15

## 2023-06-15 RX ORDER — MONTELUKAST SODIUM 10 MG/1
10 TABLET ORAL NIGHTLY
Qty: 90 TABLET | Refills: 3 | Status: SHIPPED | OUTPATIENT
Start: 2023-06-15

## 2023-06-15 RX ORDER — LEVOTHYROXINE SODIUM 0.07 MG/1
75 TABLET ORAL DAILY
Qty: 90 TABLET | Refills: 3 | Status: SHIPPED | OUTPATIENT
Start: 2023-06-15

## 2023-06-15 NOTE — PROGRESS NOTES
"Chief Complaint  Hypertension (F/u htn ) and Hip Pain (C/o L hip pain that radiates down leg x 1 month )    Subjective        Alyce Batista presents to [facility]  History of Present Illness  Follow-up hypertension controlled,  Compliant with medication, hypothyroid is well stable,  Mild hyperlipidemia and mild hypertriglyceridemia,  Family history of heart disease, not much cancer, she is concerned with her cholesterol which she can do.  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    Social history as above no change no tobacco abuse no alcohol abuse.  Past medical history no change  Family history as stated above.  Hypertension    Hip Pain       Objective   Vital Signs:  /86   Pulse 95   Temp 96 °F (35.6 °C)   Ht 152.4 cm (60\")   Wt 72.6 kg (160 lb)   SpO2 96%   BMI 31.25 kg/m²   Estimated body mass index is 31.25 kg/m² as calculated from the following:    Height as of this encounter: 152.4 cm (60\").    Weight as of this encounter: 72.6 kg (160 lb).          Physical Exam  Vitals reviewed.   Constitutional:       General: She is not in acute distress.     Appearance: Normal appearance. She is well-developed. She is not ill-appearing, toxic-appearing or diaphoretic.   HENT:      Head: Normocephalic.      Nose: Nose normal.   Eyes:      General: No scleral icterus.     Conjunctiva/sclera: Conjunctivae normal.      Pupils: Pupils are equal, round, and reactive to light.   Neck:      Thyroid: No thyromegaly.      Vascular: No JVD.   Cardiovascular:      Rate and Rhythm: Normal rate and regular rhythm.      Heart " sounds: Normal heart sounds. No murmur heard.    No friction rub. No gallop.   Pulmonary:      Effort: Pulmonary effort is normal. No respiratory distress.      Breath sounds: Normal breath sounds. No stridor. No wheezing or rales.   Abdominal:      General: Bowel sounds are normal. There is no distension.      Palpations: Abdomen is soft.      Tenderness: There is no abdominal tenderness.      Comments: No hepatosplenomegaly, no ascites,   Musculoskeletal:         General: No tenderness.      Cervical back: Neck supple.      Comments: Tenderness lateral bursa lateral trochanter and somewhat posteriorly  Outer edges of the trochanter, no palpable abnormality, some ischial tuberosity region tenderness as well ranged hip thoroughly without pain or tenderness, negative straight leg raise plantarflexion dorsiflexion normal normal gait   Lymphadenopathy:      Cervical: No cervical adenopathy.   Skin:     General: Skin is warm and dry.      Findings: No erythema or rash.   Neurological:      General: No focal deficit present.      Mental Status: She is alert and oriented to person, place, and time.      Deep Tendon Reflexes: Reflexes are normal and symmetric.   Psychiatric:         Mood and Affect: Mood normal.         Behavior: Behavior normal.         Thought Content: Thought content normal.         Judgment: Judgment normal.      Result Review :                Assessment and Plan   Diagnoses and all orders for this visit:    1. Hypertension, unspecified type (Primary)    2. Acquired hypothyroidism  -     levothyroxine (SYNTHROID, LEVOTHROID) 75 MCG tablet; Take 1 tablet by mouth Daily.  Dispense: 90 tablet; Refill: 3    3. Essential hypertension  -     losartan (COZAAR) 100 MG tablet; Take 1 tablet by mouth Daily.  Dispense: 90 tablet; Refill: 3    4. Hirsutism  -     spironolactone (ALDACTONE) 50 MG tablet; Take 1 tablet by mouth 2 (Two) Times a Day.  Dispense: 180 tablet; Refill: 2    Other orders  -     Discontinue:  methylPREDNISolone (MEDROL) 4 MG dose pack; Take as directed on package instructions.  Dispense: 21 tablet; Refill: 0  -     methylPREDNISolone (MEDROL) 4 MG dose pack; Take as directed on package instructions.  Dispense: 21 tablet; Refill: 0  -     Discontinue: FLUoxetine (PROzac) 20 MG capsule; Take 1 capsule by mouth 2 (Two) Times a Day.  Dispense: 180 capsule; Refill: 2  -     Chlorcyclizine-Pseudoephed (Stahist AD) 25-60 MG tablet; Take 1 tablet by mouth 2 (Two) Times a Day As Needed (Nasal decongestion). use sparingly and make sure blood pressure is controlled  Dispense: 60 tablet; Refill: 5  -     FLUoxetine (PROzac) 20 MG capsule; Take 1 capsule by mouth 2 (Two) Times a Day.  Dispense: 180 capsule; Refill: 2  -     montelukast (SINGULAIR) 10 MG tablet; Take 1 tablet by mouth Every Night.  Dispense: 90 tablet; Refill: 3             Follow Up   Return in about 6 months (around 12/15/2023) for Labs before next visit.  Patient was given instructions and counseling regarding her condition or for health maintenance advice. Please see specific information pulled into the AVS if appropriate.     Patient Instructions   Discharge instructions  Range of motion exercises stretching of your ligaments hip exercises daily  Medrol Dosepak for inflammation followed by 2 Aleve twice a day or ibuprofen  600    3 times a day with food and water discontinue if GI upset or take Pepcid as needed  Continue 64 ounces water daily healthy diet,  Recheck blood pressure make sure it is controlled, should be less than 130/80 on average,    If your hip pain is not significantly improved, we should get x-rays and physical therapy update me 1 month or return to office,  Severe hip pain weakness fever emergency room    Try to alternate standing and sitting standing desk if available at work,                 Answers submitted by the patient for this visit:  Other (Submitted on 6/10/2023)  Please describe your symptoms.: routine check up  Have  you had these symptoms before?: No  How long have you been having these symptoms?: 1-4 days  Please list any medications you are currently taking for this condition.: n/a  Please describe any probable cause for these symptoms. : appointment is for a routine check up  Primary Reason for Visit (Submitted on 6/10/2023)  What is the primary reason for your visit?: Other

## 2023-06-15 NOTE — PATIENT INSTRUCTIONS
Discharge instructions  Range of motion exercises stretching of your ligaments hip exercises daily  Medrol Dosepak for inflammation followed by 2 Aleve twice a day or ibuprofen  600    3 times a day with food and water discontinue if GI upset or take Pepcid as needed  Continue 64 ounces water daily healthy diet,  Recheck blood pressure make sure it is controlled, should be less than 130/80 on average,    If your hip pain is not significantly improved, we should get x-rays and physical therapy update me 1 month or return to office,  Severe hip pain weakness fever emergency room    Try to alternate standing and sitting standing desk if available at work,

## 2023-08-05 ENCOUNTER — PATIENT MESSAGE (OUTPATIENT)
Dept: FAMILY MEDICINE CLINIC | Facility: CLINIC | Age: 48
End: 2023-08-05
Payer: COMMERCIAL

## 2023-08-07 RX ORDER — TRIAMCINOLONE ACETONIDE 1 MG/G
1 CREAM TOPICAL DAILY PRN
Qty: 60 G | Refills: 1 | Status: SHIPPED | OUTPATIENT
Start: 2023-08-07

## 2023-08-07 NOTE — TELEPHONE ENCOUNTER
From: Alyce Batista  To: James Epley  Sent: 8/5/2023 9:34 AM EDT  Subject: Triamcinolone cream refill request    Good morning. Can I please have a refill for Triamcinolone cream sent to the [facility]? Did some yard work and have some bug bites and such from digging in the weeds.    Thank you.    Alyce

## 2023-08-07 NOTE — TELEPHONE ENCOUNTER
Rx Refill Note  Requested Prescriptions     Pending Prescriptions Disp Refills    triamcinolone (KENALOG) 0.1 % cream  6     Sig: Apply 1 application  topically to the appropriate area as directed Daily As Needed.      Last office visit with prescribing clinician: 6/15/2023   Last telemedicine visit with prescribing clinician: Visit date not found   Next office visit with prescribing clinician: 12/19/2023                         Would you like a call back once the refill request has been completed: [] Yes [] No    If the office needs to give you a call back, can they leave a voicemail: [] Yes [] No    Crys Feliz LPN  08/07/23, 07:37 EDT

## 2023-08-16 DIAGNOSIS — M25.552 LEFT HIP PAIN: Primary | ICD-10-CM

## 2023-08-22 ENCOUNTER — HOSPITAL ENCOUNTER (OUTPATIENT)
Dept: GENERAL RADIOLOGY | Facility: HOSPITAL | Age: 48
Discharge: HOME | End: 2023-08-22
Admitting: NURSE PRACTITIONER
Payer: COMMERCIAL

## 2023-08-22 DIAGNOSIS — M25.552 LEFT HIP PAIN: ICD-10-CM

## 2023-08-22 PROCEDURE — 73502 X-RAY EXAM HIP UNI 2-3 VIEWS: CPT

## 2023-09-18 ENCOUNTER — TREATMENT (OUTPATIENT)
Age: 48
End: 2023-09-18
Payer: COMMERCIAL

## 2023-09-18 DIAGNOSIS — M25.552 LEFT HIP PAIN: Primary | ICD-10-CM

## 2023-09-18 DIAGNOSIS — M53.3 SACRO-ILIAC PAIN: ICD-10-CM

## 2023-09-18 PROCEDURE — 97162 PT EVAL MOD COMPLEX 30 MIN: CPT | Performed by: PHYSICAL THERAPIST

## 2023-09-18 PROCEDURE — 97530 THERAPEUTIC ACTIVITIES: CPT | Performed by: PHYSICAL THERAPIST

## 2023-09-18 PROCEDURE — 97110 THERAPEUTIC EXERCISES: CPT | Performed by: PHYSICAL THERAPIST

## 2023-09-18 NOTE — PROGRESS NOTES
Physical Therapy Initial Evaluation and Plan of Care  [facility] [place]   [address] [place] [address]  P: [phone]       F: [phone]       Patient: Alyce Batista   : 1975  Visit Diagnoses:     ICD-10-CM ICD-9-CM   1. Left hip pain  M25.552 719.45   2. Sacro-iliac pain  M53.3 724.6     Referring practitioner: James Epley, APRN  Date of Initial Visit: 2023  Today's Date: 2023  Patient seen for 1 sessions           Subjective Questionnaire: LEFS: 66/80      Subjective Evaluation    History of Present Illness  Date of onset: 2023  Mechanism of injury: Patient reports her hip pain started about 6 months ago.  Intensity varies.  No know trigger.  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     PMH:   Hypertension, Acquired hypothyroidism, Dysthymic disorder, Anxiety, Migraine, Hirsutism, Hypersomnia      Subjective comment: Patient reports left hip pain.  Patient Occupation: Pharmacist Pain  At best pain ratin  At worst pain ratin  Location: Left hip  Quality: sharp and burning  Relieving factors: heat  Aggravating factors: sleeping, prolonged positioning, standing, ambulation, squatting and stairs  Progression: no change    Social Support  Lives in: multiple-level home  Lives with: alone    Hand dominance: right    Diagnostic Tests  Abnormal x-ray: early arthritis changes.    Treatments  Current treatment: massage  Patient Goals  Patient goals for therapy: decreased pain           Objective          Palpation   Left   Tenderness of the gluteus medius, piriformis and TFL.   Trigger point to gluteus medius and piriformis.     Tenderness     Left Hip   Tenderness in the PSIS, greater trochanter and sacroiliac joint.     Active Range of Motion   Left Hip   Flexion: 105 degrees   Extension: 20 degrees   Abduction: 35 degrees   External rotation (90/90):  30 degrees   Internal rotation (90/90): 40 degrees     Right Hip   Flexion: 120 degrees   Extension: 20 degrees   Abduction: 35 degrees   External rotation (90/90): 45 degrees   Internal rotation (90/90): 40 degrees     Strength/Myotome Testing     Left Hip   Planes of Motion   Flexion: 4  Extension: 4-  Abduction: 4-    Tests     Left Hip   Positive piriformis.   Modified Valdez: Positive.     Additional Tests Details  SIJ mal-alignment    Decreased hip girdle muscle flexibility    Functional Assessment   Squat   Left valgus, left tibial anterior translation beyond toes, trunk lean right, sitting toward right side, right valgus and right tibial anterior translation beyond toes.     Single Leg Stance   Left: 30 (left lateral trunk lean) seconds  Right: 30 seconds        Assessment & Plan       Assessment  Impairments: abnormal gait, abnormal or restricted ROM, impaired physical strength, pain with function and weight-bearing intolerance   Functional limitations: sleeping, walking, uncomfortable because of pain and standing (Prolonged positioning, stairs, squatting  )  Assessment details: Alyce Batista is a pleasant 48 y.o. female that presents with mild right hip ROM restrictions, decreased right hip strength, decreased hip girdle muscle flexibility, SIJ mal-alignment and pain limited functional activity tolerance.. Pt will benefit from skilled PT services in order to address listed impairments, decrease pain and restore function.    Prognosis: good  Prognosis details: Patient demonstrates good rehab potential as evidenced by high motivation to participate with PT POC and to return to PLOF/ADLs/IADLs/Work tasks.    Goals  Plan Goals: Short Term Goals (4 wks):  1.  Patient will have increased right hip ROM to WNL.  2.  Patient will have symmetrical SIJ alignment.  3.  Patient will have negative piriformis test on right hip.  4.  Patient will have increased hip girdle muscle flexibility to WNL.      Long Term Goals (8  wks):  1.  Patient will be independent in performance of HEP for carryover upon discharge from skilled PT services.  2.  Patient will increased right hip strength to 4+/5.  3.  Patient will report ability to perform functional standing and walking without pain limitations.  4.  Patient will demonstrate proper form with functional squat patterns.         Plan  Therapy options: will be seen for skilled therapy services  Planned modality interventions: cryotherapy, dry needling, ultrasound and thermotherapy (hydrocollator packs)  Planned therapy interventions: manual therapy, soft tissue mobilization, strengthening, stretching, joint mobilization, flexibility, functional ROM exercises, gait training, home exercise program, neuromuscular re-education, therapeutic activities and abdominal trunk stabilization  Frequency: 2x week  Duration in weeks: 8  Treatment plan discussed with: patient  Plan details: Pt was educated on the importance of their HEP and their current need for continued skilled physical therapy. Patients goals and potential limitations were discussed and pt is in agreement with current plan of care and treatment emphasis.            History # of Personal Factors and/or Comorbidities: HIGH (3+)  Examination of Body System(s): # of elements: MODERATE (3)  Clinical Presentation: STABLE   Clinical Decision Making: MODERATE      Timed:         Manual Therapy:         mins  72665;     Therapeutic Exercise:    15     mins  22119;     Neuromuscular Nafisa:        mins  70707;    Therapeutic Activity:     10     mins  98328;     Gait Training:           mins  78526;     Ultrasound:          mins  91974;    Ionto                                  mins  90845  Self Care                            mins  85326  Canalith Repos         mins  76625  Orthotic MGMT/Train         mins  45628    Un-Timed:  Electrical Stimulation:         mins  88035 (MC );  Dry Needling:          mins  97775 self-pay;  Dry Needling:           mins  10844 self-pay  Traction          mins  82473  Low Eval          mins  66267  Mod Eval     30     mins  50684  High Eval                            mins  29599    Timed Treatment:   25   mins   Total Treatment:     60   mins      PT SIGNATURE: Maryjane Akbar PT     License Number: [id number]  Electronically signed by Maryjane Akbar PT, 09/18/23, 8:31 AM EDT      DATE TREATMENT INITIATED: 9/18/2023    Initial Certification  Certification Period: 9/18/2023 thru 12/16/2023  I certify that the therapy services are furnished while this patient is under my care.  The services outlined above are required by this patient, and will be reviewed every 90 days.     PHYSICIAN: Epley, James, APRN      NPI: [id number]  DATE:         Please sign and return via fax to [phone]. Thank you, [facility].

## 2023-09-18 NOTE — PATIENT INSTRUCTIONS
Access Code: [email or web address]  URL: [email or web address]  Date: 09/18/2023  Prepared by: Maryjane Akbar    Exercises  - Supine Hip Adduction Isometric with Ball  - 1 x daily - 7 x weekly - 1 sets - 10 reps - 5 sec. hold  - Hooklying Clamshell with Resistance  - 1 x daily - 7 x weekly - 1 sets - 10 reps - 5 sec. hold  - Supine Straight Leg Hip Adduction and Quad Set with Ball  - 1 x daily - 7 x weekly - 1 sets - 10 reps - 5 sec. hold  - Supine Piriformis Stretch with Foot on Ground  - 1 x daily - 7 x weekly - 1 sets - 3 reps - 20 sec. hold

## 2023-10-05 ENCOUNTER — TREATMENT (OUTPATIENT)
Age: 48
End: 2023-10-05
Payer: COMMERCIAL

## 2023-10-05 DIAGNOSIS — M25.552 LEFT HIP PAIN: Primary | ICD-10-CM

## 2023-10-05 DIAGNOSIS — M53.3 SACRO-ILIAC PAIN: ICD-10-CM

## 2023-10-05 NOTE — PROGRESS NOTES
Physical Therapy Treatment Note  [facility] [place]   [address] [place] [address]  P: [phone]       F: [phone]      Patient: Alyce Batista   : 1975  Treatment Diagnosis:     ICD-10-CM ICD-9-CM   1. Left hip pain  M25.552 719.45   2. Sacro-iliac pain  M53.3 724.6     Referring practitioner: James Epley, APRN  Date of Initial Visit: Type: THERAPY  Noted: 2023  Today's Date: 10/5/2023  Patient seen for 2 sessions           Subjective   Patient reports she was awaken due to pain in the left hip and then could not get back to sleep.  States she wants to try dry needling.    Objective     See Exercise, Manual, and Modality Logs for complete treatment.       Assessment/Plan  Soft tissue was assessed at L/S spine, left hip girdle. PT noted point tenderness as well as palpable trigger points within the muslce tissue. On this date patient stated that they would like to undergo a dry needling procedure for the soft tissue dysfunction. Patient was educated on the procedure for dry needling and consent waver signed/on file. Patient was informed of the risks, possible adverse effects, along with the benefits of DN. Patient presented with SIJ/pelvic asymmetry which corrected with MET and stretching.  She responded positively to dry needling with improved muscle tone and decreased TTP.  Progressed HEP.    Progress per Plan of Care and Progress strengthening /stabilization /functional activity           Timed:         Manual Therapy:         mins  06929;     Therapeutic Exercise:    20     mins  47852;     Neuromuscular Nafisa:        mins  92633;    Therapeutic Activity:          mins  23284;     Gait Training:           mins  71259;     Ultrasound:          mins  51872;    Ionto                                  mins  74580  Self Care                            mins  77510  Canalith Repos         mins  79713  Orthotic MGMT/Train         mins   72471    Un-Timed:  Electrical Stimulation:         mins  90693 ( );  Dry Needling:          mins  20560 self-pay;  Dry Needling:     15     mins  20561 self-pay  Traction          mins  55107      Timed Treatment:   20   mins   Total Treatment:     35   mins        PT SIGNATURE: Maryjane Akbar PT     License Number: [id number]  Electronically signed by Maryjane Akbar PT, 10/05/23, 8:00 AM EDT

## 2023-10-12 ENCOUNTER — TREATMENT (OUTPATIENT)
Age: 48
End: 2023-10-12
Payer: COMMERCIAL

## 2023-10-12 DIAGNOSIS — M25.552 LEFT HIP PAIN: Primary | ICD-10-CM

## 2023-10-12 DIAGNOSIS — M53.3 SACRO-ILIAC PAIN: ICD-10-CM

## 2023-10-12 PROCEDURE — 97535 SELF CARE MNGMENT TRAINING: CPT | Performed by: PHYSICAL THERAPIST

## 2023-10-12 PROCEDURE — 97110 THERAPEUTIC EXERCISES: CPT | Performed by: PHYSICAL THERAPIST

## 2023-10-12 PROCEDURE — 97530 THERAPEUTIC ACTIVITIES: CPT | Performed by: PHYSICAL THERAPIST

## 2023-10-18 NOTE — PROGRESS NOTES
Physical Therapy Daily Treatment Note    [facility]  [address]  [address]  [place] [address]     Patient: Alyce Batista   : 1975  Referring practitioner: James Epley, APRN  Date of Initial Visit: Type: THERAPY  Noted: 2023  Today's Date: 10/12/23  Patient seen for 3 sessions         Alyce Batista reports: still with left sided hip/LB/SI discomfort.  Stretches seem to help but pain/discomfort returns with prolonged positioning(sitting, standing/walking).        Subjective     Objective   See Exercise, Manual, and Modality Logs for complete treatment.   Exercise rationale/ pain free exercise performance  Anatomy and structure of affected musculature  Posture/Postural awareness - proper workstation set up.  Lumbar support - log roll  Sleeping positions with pillows  Alternate exercise positions  Verbal/Tactile cues to ensure correct exercise performance/technique    Added: Nu Step x 6 min LTR x 10 ea dir, BKFO x 10 each LE, SKTC 4 x 10 sec, sciatic nerve glides 4 each with 20 foot pumps, ITB stretch 4 x 20 sec L, ppt/TA contraction x 10 with 5 sec hold.    -moist heat to low back with exercise performance      Assessment/Plan  Compliant/Cooperative with rehab efforts this session.  Subjectively reports no increased symptoms or discomfort with therapeutic exercise today.  Able to perform increased exercise ROM, stretching/mobilization without increased LB/SI discomfort .  Benefits from verbal/tactile cues to ensure proper exercise technique and performance as well as education regarding condition and ways to decrease stress with improved positioning/posture..          Progress per Plan of Care toward all goals as symptoms allow.           Timed:  Manual Therapy:         mins  55608;  Therapeutic Exercise:     26    mins  25745;     Neuromuscular Nafisa:        mins  75218;    Therapeutic Activity:    12      mins  26086;     Gait Training:           mins  30680;      Ultrasound:          mins  90945;    Self Care                     15  mins 73559  Untimed:  Electrical Stimulation:         mins  27820 ( );  Mechanical Traction:         mins  42750;     Timed Treatment:   53   mins   Total Treatment:     53   mins  Ilan Luz [facility]  Physical Therapist  Assistant  [id number]

## 2023-10-23 ENCOUNTER — TREATMENT (OUTPATIENT)
Age: 48
End: 2023-10-23
Payer: COMMERCIAL

## 2023-10-23 DIAGNOSIS — M53.3 SACRO-ILIAC PAIN: ICD-10-CM

## 2023-10-23 DIAGNOSIS — M25.552 LEFT HIP PAIN: Primary | ICD-10-CM

## 2023-10-23 PROCEDURE — 97110 THERAPEUTIC EXERCISES: CPT | Performed by: PHYSICAL THERAPIST

## 2023-10-23 PROCEDURE — 97530 THERAPEUTIC ACTIVITIES: CPT | Performed by: PHYSICAL THERAPIST

## 2023-10-23 NOTE — PROGRESS NOTES
PHYSICAL THERAPY DISCHARGE SUMMARY  [facility] [place]   [address] [place] [address]  P: [phone]       F: [phone]     Patient: Alyce Batista   : 1975  Diagnosis/ICD-10 Code:  Left hip pain [M25.552]  Referring practitioner: James Epley, APRN  Date of Initial Visit: Type: THERAPY  Noted: 2023  Today's Date: 10/23/2023  Patient seen for 4 sessions      Subjective:   Alyce Batista reports:   Subjective Questionnaire: deferred  Clinical Progress: improved  Home Program Compliance: Yes  Treatment has included: therapeutic exercise and therapeutic activity    Subjective  Patient reports she has been feeling much better.  States she was on vacation and did a lot of walking and hiking and did not have increased pain.      Objective   Palpation   No TTP    Active Range of Motion   Left Hip   WNL     Right Hip   WNL     Strength/Myotome Testing      Left Hip   Planes of Motion   Flexion: 4+  Extension: 4+  Abduction: 4+     Tests      Left Hip   Negative piriformis.   Modified Valdez: Negative.      Additional Tests Details  Symmetrical SIJ alignment     Hip girdle muscle flexibility WFL     Functional Assessment   Squat   Symmetrical       Assessment/Plan  Patient presents with resolved pain symptoms.  She has normal ROM, strength, and symmetrical SIJ alignment.  Her muscle flexibility has also improved to WFL.  She is independent in her HEP and symptom management.  Will discharge to [facility].   Progress toward previous goals: All Met      Goal Review  Short Term Goals:  1.  Patient will have increased right hip ROM to WNL.-met  2.  Patient will have symmetrical SIJ alignment.-met  3.  Patient will have negative piriformis test on right hip.-met  4.  Patient will have increased hip girdle muscle flexibility to WNL.-met        Long Term Goals:  1.  Patient will be independent in performance of HEP for carryover upon discharge from skilled PT services.-met  2.   Patient will increased right hip strength to 4+/5.-met  3.  Patient will report ability to perform functional standing and walking without pain limitations.-met  4.  Patient will demonstrate proper form with functional squat patterns.-met          Recommendations: Discharge    PT SIGNATURE: Maryjane Akbar PT     License Number: [id number]  Electronically signed by Maryjane Akbar PT, 10/23/23, 5:28 PM EDT    Timed:         Manual Therapy:         mins  81201;     Therapeutic Exercise:     20    mins  73052;     Neuromuscular Nafisa:        mins  85345;    Therapeutic Activity:     15     mins  89726;     Gait Training:           mins  08446;     Ultrasound:          mins  93971;    Ionto                                  mins  31270  Self Care                            mins  05105  Canalith Repos         mins  16809  Orthotic MGMT/Train         mins  77364    Un-Timed:  Electrical Stimulation:         mins  06186 ( );  Dry Needling:          mins  36510 self-pay;  Dry Needling:          mins  74912 self-pay  Traction          mins  87106  Low Eval          mins  56545  Mod Eval          mins  92740  High Eval                            mins  57427    Timed Treatment:   35   mins   Total Treatment:     35   mins

## 2023-11-22 DIAGNOSIS — I10 ESSENTIAL HYPERTENSION: ICD-10-CM

## 2023-11-22 DIAGNOSIS — L68.0 HIRSUTISM: Chronic | ICD-10-CM

## 2023-11-22 RX ORDER — SPIRONOLACTONE 50 MG/1
50 TABLET, FILM COATED ORAL 2 TIMES DAILY
Qty: 180 TABLET | Refills: 0 | Status: SHIPPED | OUTPATIENT
Start: 2023-11-22

## 2023-11-22 RX ORDER — LOSARTAN POTASSIUM 100 MG/1
100 TABLET ORAL DAILY
Qty: 90 TABLET | Refills: 0 | Status: SHIPPED | OUTPATIENT
Start: 2023-11-22

## 2023-11-22 NOTE — TELEPHONE ENCOUNTER
Rx Refill Note  Requested Prescriptions     Pending Prescriptions Disp Refills    spironolactone (ALDACTONE) 50 MG tablet 180 tablet 2     Sig: Take 1 tablet by mouth 2 (Two) Times a Day.    losartan (COZAAR) 100 MG tablet 90 tablet 3     Sig: Take 1 tablet by mouth Daily.      Last office visit with prescribing clinician: 6/15/2023   Last telemedicine visit with prescribing clinician: Visit date not found   Next office visit with prescribing clinician: 12/19/2023                         Would you like a call back once the refill request has been completed: [] Yes [] No    If the office needs to give you a call back, can they leave a voicemail: [] Yes [] No    Rob aDsilva MA  11/22/23, 12:12 EST

## 2023-11-22 NOTE — TELEPHONE ENCOUNTER
Caller: Ranjeet Batistay    Relationship: Self    Best call back number: [phone]     Requested Prescriptions:   Requested Prescriptions     Pending Prescriptions Disp Refills    spironolactone (ALDACTONE) 50 MG tablet 180 tablet 2     Sig: Take 1 tablet by mouth 2 (Two) Times a Day.    losartan (COZAAR) 100 MG tablet 90 tablet 3     Sig: Take 1 tablet by mouth Daily.        Pharmacy where request should be sent: [facility] ([facility]) - [place] [address] [facility] [phone] [facility] [phone]      Last office visit with prescribing clinician: 6/15/2023   Last telemedicine visit with prescribing clinician: Visit date not found   Next office visit with prescribing clinician: 12/19/2023       Does the patient have less than a 3 day supply:  [] Yes  [x] No    Would you like a call back once the refill request has been completed: [] Yes [x] No    If the office needs to give you a call back, can they leave a voicemail: [] Yes [x] No    Karyn Bloom Rep   11/22/23 11:52 EST

## 2023-12-06 DIAGNOSIS — E03.9 ACQUIRED HYPOTHYROIDISM: Chronic | ICD-10-CM

## 2023-12-07 RX ORDER — LEVOTHYROXINE SODIUM 0.07 MG/1
75 TABLET ORAL DAILY
Qty: 90 TABLET | Refills: 0 | Status: SHIPPED | OUTPATIENT
Start: 2023-12-07

## 2023-12-07 RX ORDER — FLUOXETINE HYDROCHLORIDE 20 MG/1
20 CAPSULE ORAL 2 TIMES DAILY
Qty: 180 CAPSULE | Refills: 0 | Status: SHIPPED | OUTPATIENT
Start: 2023-12-07

## 2023-12-07 NOTE — TELEPHONE ENCOUNTER
Rx Refill Note  Requested Prescriptions     Pending Prescriptions Disp Refills    levothyroxine (SYNTHROID, LEVOTHROID) 75 MCG tablet [Pharmacy Med Name: Levothyroxine Sodium 75 MCG Oral Tablet] 90 tablet 3     Sig: TAKE 1 TABLET BY MOUTH DAILY    FLUoxetine (PROzac) 20 MG capsule [Pharmacy Med Name: FLUoxetine HCl 20 MG Oral Capsule] 180 capsule 3     Sig: TAKE 1 CAPSULE BY MOUTH TWICE  DAILY      Last office visit with prescribing clinician: 6/15/2023   Last telemedicine visit with prescribing clinician: Visit date not found   Next office visit with prescribing clinician: 12/19/2023                         Would you like a call back once the refill request has been completed: [] Yes [] No    If the office needs to give you a call back, can they leave a voicemail: [] Yes [] No    Rob Dasilva MA  12/07/23, 08:09 EST

## 2023-12-19 ENCOUNTER — OFFICE VISIT (OUTPATIENT)
Dept: FAMILY MEDICINE CLINIC | Facility: CLINIC | Age: 48
End: 2023-12-19
Payer: COMMERCIAL

## 2023-12-19 VITALS
WEIGHT: 148.5 LBS | HEART RATE: 102 BPM | TEMPERATURE: 97.5 F | OXYGEN SATURATION: 98 % | RESPIRATION RATE: 16 BRPM | HEIGHT: 60 IN | DIASTOLIC BLOOD PRESSURE: 84 MMHG | SYSTOLIC BLOOD PRESSURE: 128 MMHG | BODY MASS INDEX: 29.16 KG/M2

## 2023-12-19 DIAGNOSIS — E03.9 ACQUIRED HYPOTHYROIDISM: Chronic | ICD-10-CM

## 2023-12-19 DIAGNOSIS — I10 HYPERTENSION, UNSPECIFIED TYPE: Chronic | ICD-10-CM

## 2023-12-19 DIAGNOSIS — F41.9 ANXIETY: ICD-10-CM

## 2023-12-19 DIAGNOSIS — R73.03 PREDIABETES: Primary | ICD-10-CM

## 2023-12-19 PROCEDURE — 99213 OFFICE O/P EST LOW 20 MIN: CPT | Performed by: NURSE PRACTITIONER

## 2023-12-19 RX ORDER — ONDANSETRON 8 MG/1
TABLET, ORALLY DISINTEGRATING ORAL
COMMUNITY
Start: 2023-12-06

## 2023-12-19 NOTE — PROGRESS NOTES
"Chief Complaint  No chief complaint on file.    Subjective        Alyce Batista presents to [facility]  History of Present Illness  Very pleasant patient here today follow-up essential hypertension nicely controlled, hypothyroid takes replacement therapy appropriately,  She likely has a history of PCOS she has been amenorrheic for quite a few years, had extensive workup with GYN, she takes  Spironolactone, for here testing, hormonal related recently she decided to make some big changes in her life and she is doing very well with 25 again, taking generic Ozempic    Compounded, safely, with metformin she is feeling better and she is down over 10 pounds and from my note she is down at least 12,  As she has some prediabetes A1c was around 6, insulin resistance, anxiety stable overall she is doing well things are going nicely she has a new rash on her forehead and around her left ear not responding to antifungals or steroids and she has appointment dermatology soon.    Hip pain much better nearly resolved occasional pain left buttock after sitting which is more suggestive of a bursitis nothing that radiates no unexplained weight loss night sweats          Objective   Vital Signs:  /84   Pulse 102   Temp 97.5 °F (36.4 °C) (Infrared)   Resp 16   Ht 152.4 cm (60\")   Wt 67.4 kg (148 lb 8 oz)   SpO2 98%   BMI 29.00 kg/m²   Estimated body mass index is 29 kg/m² as calculated from the following:    Height as of this encounter: 152.4 cm (60\").    Weight as of this encounter: 67.4 kg (148 lb 8 oz).            Physical Exam  Vitals reviewed.   Constitutional:       General: She is not in acute distress.     Appearance: Normal appearance. She is well-developed. She is not ill-appearing, toxic-appearing or diaphoretic.   HENT:      Head: Normocephalic.      Nose: Nose normal.   Eyes:      General: No scleral icterus.     Conjunctiva/sclera: Conjunctivae normal.      Pupils: Pupils are " equal, round, and reactive to light.   Neck:      Thyroid: No thyromegaly.      Vascular: No JVD.   Cardiovascular:      Rate and Rhythm: Normal rate and regular rhythm.      Heart sounds: Normal heart sounds. No murmur heard.     No friction rub. No gallop.   Pulmonary:      Effort: Pulmonary effort is normal. No respiratory distress.      Breath sounds: Normal breath sounds. No stridor. No wheezing or rales.   Abdominal:      General: Bowel sounds are normal. There is no distension.      Palpations: Abdomen is soft.      Tenderness: There is no abdominal tenderness.      Comments: No hepatosplenomegaly, no ascites,   Musculoskeletal:         General: No tenderness.      Cervical back: Neck supple.   Lymphadenopathy:      Cervical: No cervical adenopathy.   Skin:     General: Skin is warm and dry.      Findings: No erythema or rash.   Neurological:      General: No focal deficit present.      Mental Status: She is alert and oriented to person, place, and time. Mental status is at baseline.      Deep Tendon Reflexes: Reflexes are normal and symmetric.   Psychiatric:         Behavior: Behavior normal.         Thought Content: Thought content normal.         Judgment: Judgment normal.        Result Review :                Assessment and Plan   Diagnoses and all orders for this visit:    1. Prediabetes (Primary)    2. Hypertension, unspecified type    3. Acquired hypothyroidism    4. Anxiety           I spent 20  minutes caring for Alyce on this date of service. This time includes time spent by me in the following activities:preparing for the visit, reviewing tests, obtaining and/or reviewing a separately obtained history, performing a medically appropriate examination and/or evaluation , counseling and educating the patient/family/caregiver, referring and communicating with other health care professionals , documenting information in the medical record, and care coordination  Follow Up   Return in about 6 months  (around 6/19/2024).  Patient was given instructions and counseling regarding her condition or for health maintenance advice. Please see specific information pulled into the AVS if appropriate.     Patient Instructions   Discharge instructions continue the great changes you are making for your health, I think you do an excellent job maneuvering this, slow steady changes    Would recommend accommodation of prediabetes diabetes diet Mediterranean basically healthy choices, lots of fiber, low processed foods, healthy meats healthy oils,  64 ounces of water daily  Consider intermittent fasting for some             Answers submitted by the patient for this visit:  Other (Submitted on 12/14/2023)  Please describe your symptoms.: routine follow up  Have you had these symptoms before?: No  How long have you been having these symptoms?: 1-4 days  Please list any medications you are currently taking for this condition.: routine follow up  Primary Reason for Visit (Submitted on 12/14/2023)  What is the primary reason for your visit?: Other

## 2023-12-19 NOTE — PATIENT INSTRUCTIONS
Discharge instructions continue the great changes you are making for your health, I think you do an excellent job maneuvering this, slow steady changes    Would recommend accommodation of prediabetes diabetes diet Mediterranean basically healthy choices, lots of fiber, low processed foods, healthy meats healthy oils,  64 ounces of water daily  Consider intermittent fasting for some

## 2024-01-01 NOTE — TELEPHONE ENCOUNTER
Patient is to return call if she has questions about sleep study results or would like to persue further evaluation with in lab  PSG  
Yes

## 2024-01-15 RX ORDER — CLOTRIMAZOLE AND BETAMETHASONE DIPROPIONATE 10; .64 MG/G; MG/G
1 CREAM TOPICAL 2 TIMES DAILY
Qty: 45 G | Refills: 0 | Status: SHIPPED | OUTPATIENT
Start: 2024-01-15

## 2024-01-28 DIAGNOSIS — L68.0 HIRSUTISM: Chronic | ICD-10-CM

## 2024-01-28 DIAGNOSIS — I10 ESSENTIAL HYPERTENSION: ICD-10-CM

## 2024-01-29 RX ORDER — LOSARTAN POTASSIUM 100 MG/1
100 TABLET ORAL DAILY
Qty: 90 TABLET | Refills: 3 | Status: SHIPPED | OUTPATIENT
Start: 2024-01-29

## 2024-01-29 RX ORDER — SPIRONOLACTONE 50 MG/1
50 TABLET, FILM COATED ORAL 2 TIMES DAILY
Qty: 180 TABLET | Refills: 3 | Status: SHIPPED | OUTPATIENT
Start: 2024-01-29

## 2024-01-29 NOTE — TELEPHONE ENCOUNTER
Rx Refill Note  Requested Prescriptions     Pending Prescriptions Disp Refills    losartan (COZAAR) 100 MG tablet [Pharmacy Med Name: Losartan Potassium 100 MG Oral Tablet] 90 tablet 3     Sig: TAKE 1 TABLET BY MOUTH DAILY    spironolactone (ALDACTONE) 50 MG tablet [Pharmacy Med Name: Spironolactone 50 MG Oral Tablet] 180 tablet 3     Sig: TAKE 1 TABLET BY MOUTH TWICE  DAILY      Last office visit with prescribing clinician: 12/19/2023   Last telemedicine visit with prescribing clinician: Visit date not found   Next office visit with prescribing clinician: 6/18/2024                         Would you like a call back once the refill request has been completed: [] Yes [] No    If the office needs to give you a call back, can they leave a voicemail: [] Yes [] No    Rob Dasilva MA  01/29/24, 11:31 EST

## 2024-02-17 DIAGNOSIS — E03.9 ACQUIRED HYPOTHYROIDISM: Chronic | ICD-10-CM

## 2024-02-19 RX ORDER — FLUOXETINE HYDROCHLORIDE 20 MG/1
20 CAPSULE ORAL 2 TIMES DAILY
Qty: 180 CAPSULE | Refills: 1 | Status: SHIPPED | OUTPATIENT
Start: 2024-02-19

## 2024-02-19 RX ORDER — LEVOTHYROXINE SODIUM 0.07 MG/1
75 TABLET ORAL DAILY
Qty: 90 TABLET | Refills: 3 | Status: SHIPPED | OUTPATIENT
Start: 2024-02-19

## 2024-02-19 NOTE — TELEPHONE ENCOUNTER
Rx Refill Note  Requested Prescriptions     Pending Prescriptions Disp Refills    FLUoxetine (PROzac) 20 MG capsule [Pharmacy Med Name: FLUoxetine HCl 20 MG Oral Capsule] 180 capsule 3     Sig: TAKE 1 CAPSULE BY MOUTH TWICE  DAILY    levothyroxine (SYNTHROID, LEVOTHROID) 75 MCG tablet [Pharmacy Med Name: Levothyroxine Sodium 75 MCG Oral Tablet] 90 tablet 3     Sig: TAKE 1 TABLET BY MOUTH DAILY      Last office visit with prescribing clinician: 12/19/2023   Last telemedicine visit with prescribing clinician: Visit date not found   Next office visit with prescribing clinician: 6/18/2024                         Would you like a call back once the refill request has been completed: [] Yes [] No    If the office needs to give you a call back, can they leave a voicemail: [] Yes [] No    Alla Rubin MA  02/19/24, 08:29 EST

## 2024-02-20 DIAGNOSIS — Z13.6 ENCOUNTER FOR SCREENING FOR VASCULAR DISEASE: Primary | ICD-10-CM

## 2024-04-23 ENCOUNTER — HOSPITAL ENCOUNTER (OUTPATIENT)
Dept: CT IMAGING | Facility: HOSPITAL | Age: 49
Discharge: HOME | End: 2024-04-23
Admitting: NURSE PRACTITIONER

## 2024-04-23 DIAGNOSIS — Z13.6 ENCOUNTER FOR SCREENING FOR VASCULAR DISEASE: ICD-10-CM

## 2024-04-23 PROCEDURE — 75571 CT HRT W/O DYE W/CA TEST: CPT

## 2024-05-15 RX ORDER — MONTELUKAST SODIUM 10 MG/1
10 TABLET ORAL NIGHTLY
Qty: 90 TABLET | Refills: 3 | Status: SHIPPED | OUTPATIENT
Start: 2024-05-15

## 2024-05-15 NOTE — TELEPHONE ENCOUNTER
Rx Refill Note  Requested Prescriptions     Pending Prescriptions Disp Refills    montelukast (SINGULAIR) 10 MG tablet [Pharmacy Med Name: Montelukast Sodium 10 MG Oral Tablet] 90 tablet 3     Sig: TAKE 1 TABLET BY MOUTH EVERY  NIGHT      Last office visit with prescribing clinician: 12/19/2023   Last telemedicine visit with prescribing clinician: Visit date not found   Next office visit with prescribing clinician: 6/28/2024                         Would you like a call back once the refill request has been completed: [] Yes [] No    If the office needs to give you a call back, can they leave a voicemail: [] Yes [] No    Alla Rubin MA  05/15/24, 08:29 EDT

## 2024-06-28 ENCOUNTER — OFFICE VISIT (OUTPATIENT)
Dept: FAMILY MEDICINE CLINIC | Facility: CLINIC | Age: 49
End: 2024-06-28
Payer: COMMERCIAL

## 2024-06-28 VITALS
BODY MASS INDEX: 26.5 KG/M2 | HEART RATE: 82 BPM | HEIGHT: 60 IN | SYSTOLIC BLOOD PRESSURE: 116 MMHG | OXYGEN SATURATION: 100 % | WEIGHT: 135 LBS | RESPIRATION RATE: 16 BRPM | DIASTOLIC BLOOD PRESSURE: 78 MMHG

## 2024-06-28 DIAGNOSIS — E03.9 ACQUIRED HYPOTHYROIDISM: Chronic | ICD-10-CM

## 2024-06-28 DIAGNOSIS — L68.0 HIRSUTISM: Chronic | ICD-10-CM

## 2024-06-28 DIAGNOSIS — E78.5 MILD HYPERLIPIDEMIA: ICD-10-CM

## 2024-06-28 DIAGNOSIS — I10 HYPERTENSION, UNSPECIFIED TYPE: Chronic | ICD-10-CM

## 2024-06-28 DIAGNOSIS — I10 ESSENTIAL HYPERTENSION: ICD-10-CM

## 2024-06-28 DIAGNOSIS — Z00.00 HEALTH MAINTENANCE EXAMINATION: Primary | ICD-10-CM

## 2024-06-28 PROCEDURE — 99396 PREV VISIT EST AGE 40-64: CPT | Performed by: NURSE PRACTITIONER

## 2024-06-28 PROCEDURE — 93000 ELECTROCARDIOGRAM COMPLETE: CPT | Performed by: NURSE PRACTITIONER

## 2024-06-28 RX ORDER — MONTELUKAST SODIUM 10 MG/1
10 TABLET ORAL NIGHTLY
Qty: 90 TABLET | Refills: 3 | Status: SHIPPED | OUTPATIENT
Start: 2024-06-28

## 2024-06-28 RX ORDER — FLUOXETINE HYDROCHLORIDE 20 MG/1
20 CAPSULE ORAL 2 TIMES DAILY
Qty: 180 CAPSULE | Refills: 3 | Status: SHIPPED | OUTPATIENT
Start: 2024-06-28

## 2024-06-28 RX ORDER — LOSARTAN POTASSIUM 100 MG/1
100 TABLET ORAL DAILY
Qty: 90 TABLET | Refills: 3 | Status: SHIPPED | OUTPATIENT
Start: 2024-06-28

## 2024-06-28 RX ORDER — ROSUVASTATIN CALCIUM 10 MG/1
TABLET, COATED ORAL
COMMUNITY
Start: 2024-02-13

## 2024-06-28 RX ORDER — SPIRONOLACTONE 50 MG/1
50 TABLET, FILM COATED ORAL 2 TIMES DAILY
Qty: 180 TABLET | Refills: 3 | Status: SHIPPED | OUTPATIENT
Start: 2024-06-28

## 2024-06-28 RX ORDER — TACROLIMUS 1 MG/G
OINTMENT TOPICAL
COMMUNITY
Start: 2024-04-16

## 2024-06-28 NOTE — PATIENT INSTRUCTIONS
Discharge instructions    Continue healthy diet, exercise joints.  Exercise you are doing spectacular steady as you go no need to alter course here although we should be a little proactive possibly with your blood pressure as you continue your health journey      Check your blood pressure couple times a week next week, and heart rate and send me some numbers if you could,  If is averaging in the 1 teens  We should probably decrease losartan down to 50 mg daily or so,    Caution orthostatic hypotension, is a risk, and I would challenge you just to drink an extra glass of water to daily or just make sure you get at least 64 ounces of water daily

## 2024-07-01 ENCOUNTER — PATIENT MESSAGE (OUTPATIENT)
Dept: FAMILY MEDICINE CLINIC | Facility: CLINIC | Age: 49
End: 2024-07-01
Payer: COMMERCIAL

## 2024-07-01 DIAGNOSIS — Z91.09 ENVIRONMENTAL ALLERGIES: ICD-10-CM

## 2024-07-01 PROBLEM — E78.5 MILD HYPERLIPIDEMIA: Status: ACTIVE | Noted: 2024-07-01

## 2024-07-01 RX ORDER — CHLORCYCLIZINE HYDROCHLORIDE AND PSEUDOEPHEDRINE HYDROCHLORIDE 25; 60 MG/1; MG/1
1 TABLET ORAL 2 TIMES DAILY
Qty: 60 TABLET | Refills: 11 | Status: SHIPPED | OUTPATIENT
Start: 2024-07-01

## 2024-07-01 NOTE — TELEPHONE ENCOUNTER
From: Alyce Batista  To: James Epley  Sent: 7/1/2024 1:30 PM EDT  Subject: Stahist AD    Hello.    Could you please send in refills for my Stahist AD (2 boxes) to [facility]? Their phone number is [phone].     Thank you.    Alyce Batista :)

## 2024-07-01 NOTE — PROGRESS NOTES
"Chief Complaint  Annual Exam (6 month check up )    Subjective        Alyce Batista presents to [facility]  History of Present Illness  Pleasant patient here today for follow-up health maintenance, overall she is doing well  Hypertension controlled losartan, levothyroxine compliant with replacement therapy anxiety stable fluoxetine  To continue  Hyperlipidemia lovastatin,  Spironolactone antiandrogen for here to see him in the past and will continue  Social history no tobacco drug alcohol abuse, stressful job pharmacist  2 kids,  Past medical history no change family history no change  Improved diet has some weight loss, substantial, blood pressure upper 1 teens today  No dizziness        Objective   Vital Signs:  /78   Pulse 82   Resp 16   Ht 152.4 cm (60\")   Wt 61.2 kg (135 lb)   SpO2 100%   BMI 26.37 kg/m²   Estimated body mass index is 26.37 kg/m² as calculated from the following:    Height as of this encounter: 152.4 cm (60\").    Weight as of this encounter: 61.2 kg (135 lb).            Physical Exam  Vitals reviewed.   Constitutional:       General: She is not in acute distress.     Appearance: Normal appearance. She is well-developed. She is not ill-appearing, toxic-appearing or diaphoretic.   HENT:      Head: Normocephalic.      Nose: Nose normal.      Mouth/Throat:      Mouth: Mucous membranes are dry.   Eyes:      General: No scleral icterus.     Conjunctiva/sclera: Conjunctivae normal.      Pupils: Pupils are equal, round, and reactive to light.   Neck:      Thyroid: No thyromegaly.      Vascular: No JVD.   Cardiovascular:      Rate and Rhythm: Normal rate and regular rhythm.      Heart sounds: Normal heart sounds. No murmur heard.     No friction rub. No gallop.   Pulmonary:      Effort: Pulmonary effort is normal. No respiratory distress.      Breath sounds: Normal breath sounds. No stridor. No wheezing or rales.   Abdominal:      General: Bowel sounds are " normal. There is no distension.      Palpations: Abdomen is soft.      Tenderness: There is no abdominal tenderness.      Comments: No hepatosplenomegaly, no ascites,   Musculoskeletal:         General: No tenderness.      Cervical back: Neck supple.   Lymphadenopathy:      Cervical: No cervical adenopathy.   Skin:     General: Skin is warm and dry.      Findings: No erythema or rash.   Neurological:      General: No focal deficit present.      Mental Status: She is alert and oriented to person, place, and time. Mental status is at baseline.      Cranial Nerves: No cranial nerve deficit.      Motor: No weakness.      Deep Tendon Reflexes: Reflexes are normal and symmetric.   Psychiatric:         Behavior: Behavior normal.         Thought Content: Thought content normal.         Judgment: Judgment normal.        Result Review :                Assessment and Plan   Diagnoses and all orders for this visit:    1. Health maintenance examination (Primary)  -     ECG 12 Lead    2. Mild hyperlipidemia  -     ECG 12 Lead    3. Hypertension, unspecified type  -     ECG 12 Lead    4. Acquired hypothyroidism    5. Essential hypertension  -     losartan (COZAAR) 100 MG tablet; Take 1 tablet by mouth Daily.  Dispense: 90 tablet; Refill: 3    6. Hirsutism  -     spironolactone (ALDACTONE) 50 MG tablet; Take 1 tablet by mouth 2 (Two) Times a Day.  Dispense: 180 tablet; Refill: 3    Other orders  -     FLUoxetine (PROzac) 20 MG capsule; Take 1 capsule by mouth 2 (Two) Times a Day.  Dispense: 180 capsule; Refill: 3  -     montelukast (SINGULAIR) 10 MG tablet; Take 1 tablet by mouth Every Night.  Dispense: 90 tablet; Refill: 3             Follow Up   Return in about 6 months (around 12/28/2024).  Patient was given instructions and counseling regarding her condition or for health maintenance advice. Please see specific information pulled into the AVS if appropriate.     Patient Instructions   Discharge instructions    Continue healthy  diet, exercise joints.  Exercise you are doing spectacular steady as you go no need to alter course here although we should be a little proactive possibly with your blood pressure as you continue your health journey      Check your blood pressure couple times a week next week, and heart rate and send me some numbers if you could,  If is averaging in the 1 teens  We should probably decrease losartan down to 50 mg daily or so,    Caution orthostatic hypotension, is a risk, and I would challenge you just to drink an extra glass of water to daily or just make sure you get at least 64 ounces of water daily               Discharge instructions    Continue healthy diet, exercise joints.  Exercise you are doing spectacular steady as you go no need to alter course here although we should be a little proactive possibly with your blood pressure as you continue your health journey      Check your blood pressure couple times a week next week, and heart rate and send me some numbers if you could,  If is averaging in the 1 teens  We should probably decrease losartan down to 50 mg daily or so,    Caution orthostatic hypotension, is a risk, and I would challenge you just to drink an extra glass of water to daily or just make sure you get at least 64 ounces of water daily

## 2024-12-19 ENCOUNTER — OFFICE VISIT (OUTPATIENT)
Dept: FAMILY MEDICINE CLINIC | Facility: CLINIC | Age: 49
End: 2024-12-19
Payer: COMMERCIAL

## 2024-12-19 VITALS
HEIGHT: 60 IN | SYSTOLIC BLOOD PRESSURE: 122 MMHG | DIASTOLIC BLOOD PRESSURE: 82 MMHG | HEART RATE: 107 BPM | OXYGEN SATURATION: 99 % | TEMPERATURE: 98.4 F | WEIGHT: 129 LBS | BODY MASS INDEX: 25.32 KG/M2

## 2024-12-19 DIAGNOSIS — E78.5 MILD HYPERLIPIDEMIA: ICD-10-CM

## 2024-12-19 DIAGNOSIS — R73.03 PREDIABETES: Primary | ICD-10-CM

## 2024-12-19 DIAGNOSIS — E83.52 HYPERCALCEMIA: ICD-10-CM

## 2024-12-19 DIAGNOSIS — I10 ESSENTIAL HYPERTENSION: ICD-10-CM

## 2024-12-19 DIAGNOSIS — E03.9 ACQUIRED HYPOTHYROIDISM: Chronic | ICD-10-CM

## 2024-12-19 DIAGNOSIS — I10 HYPERTENSION, UNSPECIFIED TYPE: Chronic | ICD-10-CM

## 2024-12-19 PROCEDURE — 99214 OFFICE O/P EST MOD 30 MIN: CPT | Performed by: NURSE PRACTITIONER

## 2024-12-19 RX ORDER — ESTRADIOL 1 MG/1
1 TABLET ORAL DAILY
COMMUNITY
Start: 2024-12-02

## 2024-12-19 RX ORDER — ALPRAZOLAM 0.5 MG
0.5 TABLET ORAL DAILY PRN
COMMUNITY
Start: 2024-11-26

## 2024-12-19 RX ORDER — LOSARTAN POTASSIUM 50 MG/1
50 TABLET ORAL DAILY
Start: 2024-12-19

## 2024-12-19 RX ORDER — MONTELUKAST SODIUM 10 MG/1
10 TABLET ORAL NIGHTLY
Qty: 90 TABLET | Refills: 3 | Status: SHIPPED | OUTPATIENT
Start: 2024-12-19

## 2024-12-19 RX ORDER — LEVOTHYROXINE SODIUM 75 UG/1
75 TABLET ORAL DAILY
Qty: 90 TABLET | Refills: 3 | Status: SHIPPED | OUTPATIENT
Start: 2024-12-19

## 2024-12-19 RX ORDER — PROGESTERONE 100 MG/1
100 CAPSULE ORAL DAILY
COMMUNITY
Start: 2024-12-02

## 2024-12-19 NOTE — PATIENT INSTRUCTIONS
Discharge instructions, continue present plan to do excellent,    Levothyroxine 75 mcg daily except 1 day a week 1/2 tablet    Outpatient labs, nonfasting is fine in 6 weeks or so      Losartan was try to decrease it to 50 mg daily one half of the 100 mg tablet about 3 weeks or so let me know a couple blood pressure readings and heart rate if you could if it is obvious you need increase it back to 100 go ahead

## 2024-12-19 NOTE — PROGRESS NOTES
"Chief Complaint  Diabetes    Subjective        Alyce Batista presents to [facility]  History of Present Illness  Very pleasant patient here today follow-up prediabetes, hyperlipidemia, she is doing well with the statin, she is seeing weight loss clinic   [facility] clinic it looks like they are doing a good job with her working with a compounded semaglutide, she is doing well, her A1c is around 6.1, she is down over 30 pounds feeling better, blood pressure looks nice,  Hypothyroid although her TSH is mildly low, calcium was elevated the last 2, previously most of her numbers look good with 1 isolated elevation, vitamin D mid range,  Additional vitamin D if she does not take calcium supplementation, recently started some hormones as she was having some hot flashes excetra and perimenopausal, 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      Diabetes      routine follow up  Symptoms are: new.   Onset was at an unknown time.   Symptoms occur: constantly.  Treatment and/or Medications comments include: n/a   Additional information: n/a      Objective   Vital Signs:  /82   Pulse 107   Temp 98.4 °F (36.9 °C) (Oral)   Ht 152.4 cm (60\")   Wt 58.5 kg (129 lb)   SpO2 99%   BMI 25.19 kg/m²   Estimated body mass index is 25.19 kg/m² as calculated from the following:    Height as of this encounter: 152.4 cm (60\").    Weight as of this encounter: 58.5 kg (129 lb).          Physical Exam  Vitals reviewed.   Constitutional:       General: She is not in acute distress.     Appearance: Normal appearance. She is well-developed. She is not ill-appearing, toxic-appearing or diaphoretic.   HENT:      Head: Normocephalic.      Nose: Nose normal.   Eyes:      General: No scleral icterus.     Conjunctiva/sclera: Conjunctivae normal.      Pupils: Pupils are equal, round, and reactive to light.   Neck:      Thyroid: No thyromegaly.      Vascular: No JVD.      Comments: Thyroid normal  Cardiovascular:      " Rate and Rhythm: Normal rate and regular rhythm.      Heart sounds: Normal heart sounds. No murmur heard.     No friction rub. No gallop.   Pulmonary:      Effort: Pulmonary effort is normal. No respiratory distress.      Breath sounds: Normal breath sounds. No stridor. No wheezing or rales.   Abdominal:      Comments: No hepatosplenomegaly, no ascites,   Musculoskeletal:         General: No tenderness.      Cervical back: Neck supple.   Lymphadenopathy:      Cervical: No cervical adenopathy.   Skin:     General: Skin is warm and dry.      Findings: No erythema or rash.   Neurological:      Mental Status: She is alert and oriented to person, place, and time.      Deep Tendon Reflexes: Reflexes are normal and symmetric.   Psychiatric:         Behavior: Behavior normal.         Thought Content: Thought content normal.         Judgment: Judgment normal.        Result Review :                Assessment and Plan   Diagnoses and all orders for this visit:    1. Prediabetes (Primary)    2. Hypertension, unspecified type    3. Mild hyperlipidemia    4. Acquired hypothyroidism             Follow Up   No follow-ups on file.  Patient was given instructions and counseling regarding her condition or for health maintenance advice. Please see specific information pulled into the AVS if appropriate.     There are no Patient Instructions on file for this visit.       Answers submitted by the patient for this visit:  Primary Reason for Visit (Submitted on 12/12/2024)  What is the primary reason for your visit?: Problem Not Listed

## 2025-01-24 ENCOUNTER — TELEPHONE (OUTPATIENT)
Dept: FAMILY MEDICINE CLINIC | Facility: CLINIC | Age: 50
End: 2025-01-24

## 2025-01-24 NOTE — TELEPHONE ENCOUNTER
Hub staff attempted to follow warm transfer process and was unsuccessful     Caller: lAyce Batista    Relationship to patient: Self    Best call back number: [phone]     Patient is needing:     PATIENT IS NEEDING TO CANCEL LAB APPOINTMENT AND RESCHEDULE 6 MONTHS OUT.      PLEASE RETURN CALL

## 2025-04-09 DIAGNOSIS — I10 ESSENTIAL HYPERTENSION: ICD-10-CM

## 2025-04-09 RX ORDER — LOSARTAN POTASSIUM 50 MG/1
50 TABLET ORAL DAILY
Qty: 90 TABLET | Refills: 2
Start: 2025-04-09 | End: 2025-04-10 | Stop reason: SDUPTHER

## 2025-04-09 NOTE — TELEPHONE ENCOUNTER
Rx Refill Note  Requested Prescriptions     Pending Prescriptions Disp Refills    losartan (COZAAR) 50 MG tablet       Sig: Take 1 tablet by mouth Daily.      Last office visit with prescribing clinician: 12/19/2024   Last telemedicine visit with prescribing clinician: Visit date not found   Next office visit with prescribing clinician: 6/19/2025                         Would you like a call back once the refill request has been completed: [] Yes [] No    If the office needs to give you a call back, can they leave a voicemail: [] Yes [] No    Bess Macario MA  04/09/25, 16:01 EDT

## 2025-04-10 DIAGNOSIS — I10 ESSENTIAL HYPERTENSION: ICD-10-CM

## 2025-04-10 RX ORDER — LOSARTAN POTASSIUM 50 MG/1
50 TABLET ORAL DAILY
Qty: 90 TABLET | Refills: 3 | Status: SHIPPED | OUTPATIENT
Start: 2025-04-10

## 2025-06-02 ENCOUNTER — TELEPHONE (OUTPATIENT)
Dept: FAMILY MEDICINE CLINIC | Facility: CLINIC | Age: 50
End: 2025-06-02

## 2025-06-02 NOTE — TELEPHONE ENCOUNTER
Caller: Alyce Batista    Relationship: Self    Best call back number: [phone]    What test was performed: LABS     When was the test performed: A FEW WEEKS AGO    Where was the test performed: [facility]    Additional notes: PATIENT WOULD LIKE TO KNOW IF JAMES EPLEY WOULD LIKE TO REVIEW THE RESULTS. PATIENT CAN SEND THESE TO HIM AND GO OVER THEM. PLEASE ADVISE ASAP.

## 2025-06-05 ENCOUNTER — TELEPHONE (OUTPATIENT)
Dept: FAMILY MEDICINE CLINIC | Facility: CLINIC | Age: 50
End: 2025-06-05
Payer: COMMERCIAL

## 2025-06-05 ENCOUNTER — TELEPHONE (OUTPATIENT)
Dept: FAMILY MEDICINE CLINIC | Facility: CLINIC | Age: 50
End: 2025-06-05

## 2025-06-05 DIAGNOSIS — E67.3 HIGH VITAMIN D LEVEL: ICD-10-CM

## 2025-06-05 DIAGNOSIS — E83.52 HYPERCALCEMIA: ICD-10-CM

## 2025-06-05 DIAGNOSIS — E87.1 HYPONATREMIA: Primary | ICD-10-CM

## 2025-06-05 NOTE — TELEPHONE ENCOUNTER
Hub staff attempted to follow warm transfer process and was unsuccessful     Caller: Alyce Batista    Relationship to patient: Self    Best call back number: [phone]     Patient is needing: PATIENT IS NEEDING TO RESCHEDULE LABS FROM 6/11/25 TO SOMETIME IN DECEMBER. PLEASE CALL TO RESCHEDULE.

## 2025-07-01 ENCOUNTER — TELEPHONE (OUTPATIENT)
Dept: FAMILY MEDICINE CLINIC | Facility: CLINIC | Age: 50
End: 2025-07-01
Payer: COMMERCIAL

## 2025-07-01 PROBLEM — J32.9 CHRONIC CONGESTION OF PARANASAL SINUS: Status: ACTIVE | Noted: 2025-07-01

## 2025-07-02 DIAGNOSIS — Z91.09 ENVIRONMENTAL ALLERGIES: ICD-10-CM

## 2025-07-02 RX ORDER — CHLORCYCLIZINE HYDROCHLORIDE AND PSEUDOEPHEDRINE HYDROCHLORIDE 25; 60 MG/1; MG/1
1 TABLET ORAL 2 TIMES DAILY
Qty: 60 TABLET | Refills: 3 | Status: SHIPPED | OUTPATIENT
Start: 2025-07-02

## 2025-07-02 NOTE — TELEPHONE ENCOUNTER
Rx Refill Note  Requested Prescriptions     Pending Prescriptions Disp Refills    Chlorcyclizine-Pseudoephed (Stahist AD) 25-60 MG tablet 60 tablet 3     Sig: Take 1 tablet by mouth 2 (Two) Times a Day. As needed,      Last office visit with prescribing clinician: 12/19/2024   Last telemedicine visit with prescribing clinician: Visit date not found   Next office visit with prescribing clinician: 12/22/2025                         Would you like a call back once the refill request has been completed: [] Yes [] No    If the office needs to give you a call back, can they leave a voicemail: [] Yes [] No    Rubi Gallardo MA  07/02/25, 16:01 EDT

## 2025-07-07 ENCOUNTER — PATIENT MESSAGE (OUTPATIENT)
Dept: FAMILY MEDICINE CLINIC | Facility: CLINIC | Age: 50
End: 2025-07-07
Payer: COMMERCIAL

## 2025-07-07 DIAGNOSIS — Z91.09 ENVIRONMENTAL ALLERGIES: ICD-10-CM

## 2025-07-08 RX ORDER — CHLORCYCLIZINE HYDROCHLORIDE AND PSEUDOEPHEDRINE HYDROCHLORIDE 25; 60 MG/1; MG/1
1 TABLET ORAL 2 TIMES DAILY
Qty: 60 TABLET | Refills: 3 | Status: SHIPPED | OUTPATIENT
Start: 2025-07-08

## 2025-08-17 DIAGNOSIS — L68.0 HIRSUTISM: Chronic | ICD-10-CM

## 2025-08-18 RX ORDER — SPIRONOLACTONE 50 MG/1
50 TABLET, FILM COATED ORAL 2 TIMES DAILY
Qty: 180 TABLET | Refills: 3 | Status: SHIPPED | OUTPATIENT
Start: 2025-08-18

## (undated) DEVICE — CANN O2 ETCO2 FITS ALL CONN CO2 SMPL A/ 7IN DISP LF

## (undated) DEVICE — LN SMPL CO2 SHTRM SD STREAM W/M LUER

## (undated) DEVICE — ADAPT CLN BIOGUARD AIR/H2O DISP

## (undated) DEVICE — THE SINGLE USE ETRAP – POLYP TRAP IS USED FOR SUCTION RETRIEVAL OF ENDOSCOPICALLY REMOVED POLYPS.: Brand: ETRAP

## (undated) DEVICE — SENSR O2 OXIMAX FNGR A/ 18IN NONSTR

## (undated) DEVICE — TUBING, SUCTION, 1/4" X 10', STRAIGHT: Brand: MEDLINE

## (undated) DEVICE — SNAR POLYP CAPTIVATOR RND STFF 2.4 240CM 10MM 1P/U

## (undated) DEVICE — SINGLE-USE BIOPSY FORCEPS: Brand: RADIAL JAW 4

## (undated) DEVICE — KT ORCA ORCAPOD DISP STRL